# Patient Record
Sex: FEMALE | Race: WHITE | NOT HISPANIC OR LATINO | Employment: OTHER | URBAN - METROPOLITAN AREA
[De-identification: names, ages, dates, MRNs, and addresses within clinical notes are randomized per-mention and may not be internally consistent; named-entity substitution may affect disease eponyms.]

---

## 2017-05-02 ENCOUNTER — TRANSCRIBE ORDERS (OUTPATIENT)
Dept: ADMINISTRATIVE | Facility: HOSPITAL | Age: 67
End: 2017-05-02

## 2017-05-02 DIAGNOSIS — Z12.31 VISIT FOR SCREENING MAMMOGRAM: Primary | ICD-10-CM

## 2017-05-30 ENCOUNTER — APPOINTMENT (OUTPATIENT)
Dept: MAMMOGRAPHY | Facility: HOSPITAL | Age: 67
End: 2017-05-30

## 2017-06-01 ENCOUNTER — HOSPITAL ENCOUNTER (OUTPATIENT)
Dept: MAMMOGRAPHY | Facility: HOSPITAL | Age: 67
Discharge: HOME | End: 2017-06-01
Admitting: FAMILY MEDICINE

## 2017-06-01 DIAGNOSIS — Z12.31 VISIT FOR SCREENING MAMMOGRAM: ICD-10-CM

## 2017-06-01 PROCEDURE — 77063 BREAST TOMOSYNTHESIS BI: CPT

## 2017-06-01 PROCEDURE — G0202 SCR MAMMO BI INCL CAD: HCPCS

## 2017-06-01 PROCEDURE — G0202 SCR MAMMO BI INCL CAD: HCPCS | Performed by: RADIOLOGY

## 2017-06-01 PROCEDURE — 77063 BREAST TOMOSYNTHESIS BI: CPT | Performed by: RADIOLOGY

## 2018-05-17 ENCOUNTER — TRANSCRIBE ORDERS (OUTPATIENT)
Dept: ADMINISTRATIVE | Facility: HOSPITAL | Age: 68
End: 2018-05-17

## 2018-05-17 DIAGNOSIS — Z12.31 VISIT FOR SCREENING MAMMOGRAM: Primary | ICD-10-CM

## 2018-06-12 ENCOUNTER — HOSPITAL ENCOUNTER (OUTPATIENT)
Dept: MAMMOGRAPHY | Facility: HOSPITAL | Age: 68
Discharge: HOME | End: 2018-06-12
Admitting: FAMILY MEDICINE

## 2018-06-12 DIAGNOSIS — Z12.31 VISIT FOR SCREENING MAMMOGRAM: ICD-10-CM

## 2018-06-12 PROCEDURE — 77063 BREAST TOMOSYNTHESIS BI: CPT

## 2018-06-12 PROCEDURE — 77067 SCR MAMMO BI INCL CAD: CPT | Performed by: RADIOLOGY

## 2018-06-12 PROCEDURE — 77067 SCR MAMMO BI INCL CAD: CPT

## 2018-06-12 PROCEDURE — 77063 BREAST TOMOSYNTHESIS BI: CPT | Performed by: RADIOLOGY

## 2018-12-30 ENCOUNTER — APPOINTMENT (OUTPATIENT)
Dept: GENERAL RADIOLOGY | Facility: HOSPITAL | Age: 68
End: 2018-12-30

## 2018-12-30 ENCOUNTER — HOSPITAL ENCOUNTER (EMERGENCY)
Facility: HOSPITAL | Age: 68
Discharge: HOME | End: 2018-12-30
Attending: EMERGENCY MEDICINE | Admitting: EMERGENCY MEDICINE

## 2018-12-30 VITALS
BODY MASS INDEX: 22.34 KG/M2 | HEART RATE: 59 BPM | TEMPERATURE: 97.9 F | RESPIRATION RATE: 18 BRPM | WEIGHT: 139 LBS | DIASTOLIC BLOOD PRESSURE: 72 MMHG | OXYGEN SATURATION: 96 % | SYSTOLIC BLOOD PRESSURE: 138 MMHG | HEIGHT: 66 IN

## 2018-12-30 DIAGNOSIS — S50.02XA CONTUSION OF LEFT ELBOW, INITIAL ENCOUNTER: ICD-10-CM
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
## 2018-12-30 PROCEDURE — 99283 EMERGENCY DEPT VISIT LOW MDM: CPT

## 2018-12-30 PROCEDURE — 73080 X-RAY EXAM OF ELBOW: CPT

## 2018-12-30 RX ORDER — ACETAMINOPHEN 325 MG/1
650 TABLET ORAL EVERY 6 HOURS PRN
Status: DISCONTINUED | OUTPATIENT
Start: 2018-12-30 | End: 2018-12-30 | Stop reason: HOSPADM

## 2018-12-30 RX ORDER — IRBESARTAN AND HYDROCHLOROTHIAZIDE 300; 12.5 MG/1; MG/1
1 TABLET, FILM COATED ORAL DAILY
COMMUNITY
End: 2022-07-08

## 2018-12-30 RX ORDER — MELATONIN
1000 2 TIMES DAILY
COMMUNITY

## 2018-12-30 RX ADMIN — ACETAMINOPHEN 650 MG: 325 TABLET, FILM COATED ORAL at 09:56

## 2019-05-28 ENCOUNTER — TRANSCRIBE ORDERS (OUTPATIENT)
Dept: ADMINISTRATIVE | Facility: HOSPITAL | Age: 69
End: 2019-05-28

## 2019-05-28 DIAGNOSIS — Z12.31 VISIT FOR SCREENING MAMMOGRAM: Primary | ICD-10-CM

## 2019-08-05 ENCOUNTER — HOSPITAL ENCOUNTER (OUTPATIENT)
Dept: MAMMOGRAPHY | Facility: HOSPITAL | Age: 69
Discharge: HOME | End: 2019-08-05
Admitting: FAMILY MEDICINE

## 2019-08-05 DIAGNOSIS — Z12.31 VISIT FOR SCREENING MAMMOGRAM: ICD-10-CM

## 2019-08-05 PROCEDURE — 77063 BREAST TOMOSYNTHESIS BI: CPT

## 2019-08-05 PROCEDURE — 77067 SCR MAMMO BI INCL CAD: CPT | Performed by: RADIOLOGY

## 2019-08-05 PROCEDURE — 77067 SCR MAMMO BI INCL CAD: CPT

## 2019-08-05 PROCEDURE — 77063 BREAST TOMOSYNTHESIS BI: CPT | Performed by: RADIOLOGY

## 2020-06-30 ENCOUNTER — TRANSCRIBE ORDERS (OUTPATIENT)
Dept: ADMINISTRATIVE | Facility: HOSPITAL | Age: 70
End: 2020-06-30

## 2020-06-30 DIAGNOSIS — Z12.31 VISIT FOR SCREENING MAMMOGRAM: Primary | ICD-10-CM

## 2020-09-11 ENCOUNTER — APPOINTMENT (OUTPATIENT)
Dept: MAMMOGRAPHY | Facility: HOSPITAL | Age: 70
End: 2020-09-11

## 2020-11-20 ENCOUNTER — HOSPITAL ENCOUNTER (OUTPATIENT)
Dept: MAMMOGRAPHY | Facility: HOSPITAL | Age: 70
Discharge: HOME | End: 2020-11-20
Admitting: FAMILY MEDICINE

## 2020-11-20 DIAGNOSIS — Z12.31 VISIT FOR SCREENING MAMMOGRAM: ICD-10-CM

## 2020-11-20 PROCEDURE — 77063 BREAST TOMOSYNTHESIS BI: CPT | Performed by: RADIOLOGY

## 2020-11-20 PROCEDURE — 77067 SCR MAMMO BI INCL CAD: CPT | Performed by: RADIOLOGY

## 2020-11-20 PROCEDURE — 77067 SCR MAMMO BI INCL CAD: CPT

## 2020-11-20 PROCEDURE — 77063 BREAST TOMOSYNTHESIS BI: CPT

## 2021-01-18 ENCOUNTER — TELEPHONE (OUTPATIENT)
Dept: GASTROENTEROLOGY | Facility: CLINIC | Age: 71
End: 2021-01-18

## 2021-01-18 NOTE — TELEPHONE ENCOUNTER
CALLED PATIENT TO INFORM OF CLEARANCE, NO ANSWER, LVM FOR PATIENT TO CALL BACK. CLEARANCE SCANNED TO CHART.

## 2021-01-19 NOTE — TELEPHONE ENCOUNTER
SPOKE WITH PATIENT GAVE HER RECOMMENDATION, SHE GAVE VERBAL UNDERSTANDING.    CLEARANCE IS SCANNED TO CHART.

## 2021-01-27 DIAGNOSIS — Z12.11 SCREENING FOR COLON CANCER: Primary | ICD-10-CM

## 2021-01-31 ENCOUNTER — APPOINTMENT (OUTPATIENT)
Dept: PREADMISSION TESTING | Facility: HOSPITAL | Age: 71
End: 2021-01-31

## 2021-01-31 LAB — SARS-COV-2 RNA RESP QL NAA+PROBE: NOT DETECTED

## 2021-01-31 PROCEDURE — C9803 HOPD COVID-19 SPEC COLLECT: HCPCS

## 2021-01-31 PROCEDURE — U0004 COV-19 TEST NON-CDC HGH THRU: HCPCS

## 2021-02-02 ENCOUNTER — OUTSIDE FACILITY SERVICE (OUTPATIENT)
Dept: GASTROENTEROLOGY | Facility: CLINIC | Age: 71
End: 2021-02-02

## 2021-02-02 PROCEDURE — 45385 COLONOSCOPY W/LESION REMOVAL: CPT | Performed by: INTERNAL MEDICINE

## 2021-02-02 PROCEDURE — 88305 TISSUE EXAM BY PATHOLOGIST: CPT | Performed by: INTERNAL MEDICINE

## 2021-02-03 ENCOUNTER — LAB REQUISITION (OUTPATIENT)
Dept: LAB | Facility: HOSPITAL | Age: 71
End: 2021-02-03

## 2021-02-03 DIAGNOSIS — Z12.11 ENCOUNTER FOR SCREENING FOR MALIGNANT NEOPLASM OF COLON: ICD-10-CM

## 2021-02-04 LAB
CYTO UR: NORMAL
LAB AP CASE REPORT: NORMAL
LAB AP CLINICAL INFORMATION: NORMAL
PATH REPORT.FINAL DX SPEC: NORMAL
PATH REPORT.GROSS SPEC: NORMAL

## 2021-10-13 ENCOUNTER — TRANSCRIBE ORDERS (OUTPATIENT)
Dept: ADMINISTRATIVE | Facility: HOSPITAL | Age: 71
End: 2021-10-13

## 2021-10-13 DIAGNOSIS — Z12.31 VISIT FOR SCREENING MAMMOGRAM: Primary | ICD-10-CM

## 2021-11-16 ENCOUNTER — OFFICE VISIT (OUTPATIENT)
Dept: INTERNAL MEDICINE | Facility: CLINIC | Age: 71
End: 2021-11-16

## 2021-11-16 VITALS
OXYGEN SATURATION: 96 % | DIASTOLIC BLOOD PRESSURE: 90 MMHG | HEART RATE: 61 BPM | TEMPERATURE: 97.7 F | HEIGHT: 66 IN | SYSTOLIC BLOOD PRESSURE: 150 MMHG | BODY MASS INDEX: 22.36 KG/M2 | WEIGHT: 139.12 LBS

## 2021-11-16 DIAGNOSIS — F41.9 ANXIETY: ICD-10-CM

## 2021-11-16 DIAGNOSIS — I10 BENIGN HYPERTENSION: ICD-10-CM

## 2021-11-16 DIAGNOSIS — Z76.89 ENCOUNTER TO ESTABLISH CARE: Primary | ICD-10-CM

## 2021-11-16 DIAGNOSIS — I63.30 CEREBROVASCULAR ACCIDENT (CVA) DUE TO THROMBOSIS OF CEREBRAL ARTERY (HCC): ICD-10-CM

## 2021-11-16 DIAGNOSIS — E78.5 HYPERLIPIDEMIA, UNSPECIFIED HYPERLIPIDEMIA TYPE: ICD-10-CM

## 2021-11-16 DIAGNOSIS — G47.00 INSOMNIA, UNSPECIFIED TYPE: ICD-10-CM

## 2021-11-16 PROCEDURE — 99203 OFFICE O/P NEW LOW 30 MIN: CPT | Performed by: NURSE PRACTITIONER

## 2021-11-16 RX ORDER — MELATONIN 3 MG
LOZENGE ORAL
COMMUNITY

## 2021-11-16 RX ORDER — FEXOFENADINE HYDROCHLORIDE 60 MG/1
60 TABLET, FILM COATED ORAL DAILY
COMMUNITY

## 2021-11-16 RX ORDER — CLOPIDOGREL BISULFATE 75 MG/1
75 TABLET ORAL DAILY
Qty: 90 TABLET | Refills: 3 | Status: SHIPPED | OUTPATIENT
Start: 2021-11-16 | End: 2022-12-13

## 2021-11-16 RX ORDER — ACETAMINOPHEN 500 MG
500 TABLET ORAL EVERY 6 HOURS PRN
COMMUNITY
End: 2023-03-13

## 2021-11-16 RX ORDER — PRAVASTATIN SODIUM 40 MG
40 TABLET ORAL DAILY
COMMUNITY
End: 2022-01-03 | Stop reason: SDUPTHER

## 2021-11-16 RX ORDER — HYDROCHLOROTHIAZIDE 12.5 MG/1
12.5 TABLET ORAL DAILY
Qty: 60 TABLET | Refills: 0 | Status: SHIPPED | OUTPATIENT
Start: 2021-11-16 | End: 2022-01-12

## 2021-11-16 NOTE — PROGRESS NOTES
Date: 2021    Name: Lisa Cruz  : 1950    Chief Complaint:   Chief Complaint   Patient presents with   • [facility]     med refill       HPI:  Lisa Cruz is a 71 y.o. female presents to [facility]. Her PCP is no longer practicing in [place].      HTN: monitors at home.  Takes carvedilol 12.5 mg daily, Avalide 300-12.5 mg daily.  Feels losartan control blood pressure better than irbesartan, was switched when losartan was off the market.  Tries to eat a heart healthy diet.  Is physically active daily, no formal exercise program. Denies chest pain, dyspnea, orthopnea, palpitations, lower extremity edema, confusion, headaches, weakness, visual disturbances.    CVA, hyperlipidemia: No residual.  Takes clopidogrel 75 mg, pravastatin 40 mg as prescribed.  Requesting refill of Plavix. States labs were drawn, including lipid panel, within the past 6 months per previous provider.    Has some anxiety, not currently taking medication for it. Denies depressed mood, anhedonia, insomnia, hypersomnia, fatigue, feelings of worthlessness, difficulty concentrating, impaired memory, SI, HI, panic attacks, weight change.    Insomnia: Takes melatonin liquid, works well.       History:  LMP: No LMP recorded. Patient has had a hysterectomy.  Menopause at 41 years  Sexual activity: monogamous, heterosexual relationship  : 3  Para: 2    Do you take any herbs or supplements that were not prescribed by a doctor? yes, biactive, benefiber, melatonin  Are you taking calcium supplements? yes  Are you taking aspirin daily? no      Health Habits:  Dental Exam. up to date  Eye Exam. up to date, wears glasses  Exercise: gardening, yardwork daily during warm weather.  Housekeeping during cold weather  Current diet: well balanced    History:    Past Medical History:   Diagnosis Date   • Colon polyp    • GERD (gastroesophageal reflux disease)    • History of blood transfusion    • Hyperlipidemia    •  Hypertension    • Osteoporosis    • Ovarian cyst    • Stroke (HCC)        Past Surgical History:   Procedure Laterality Date   • BIOPSY OF LEG     • BREAST EXCISIONAL BIOPSY Bilateral     5 biopsies 1970; 1975; 1983; 1986; 1989   • HYSTERECTOMY  1991    Total w/BSO   • OOPHORECTOMY Bilateral 1991       Family History   Problem Relation Age of Onset   • Ovarian cancer Maternal Aunt 88   • Diabetes Mother    • Hypertension Mother    • Hyperlipidemia Mother    • Osteoporosis Mother    • Stroke Mother    • Cancer Father         lymphoma   • Diabetes Father    • Hypertension Father    • Hyperlipidemia Father        Social History     Socioeconomic History   • Marital status:    Tobacco Use   • Smoking status: Never Smoker   • Smokeless tobacco: Never Used   Substance and Sexual Activity   • Alcohol use: No     Comment: occ   • Drug use: No   • Sexual activity: Defer       No Known Allergies      Current Outpatient Medications:   •  acetaminophen (TYLENOL) 500 MG tablet, Take 500 mg by mouth Every 6 (Six) Hours As Needed for Mild Pain ., Disp: , Rfl:   •  carvedilol (COREG) 12.5 MG tablet, 6.25 mg., Disp: , Rfl: 0  •  cholecalciferol (VITAMIN D3) 1000 units tablet, Take 1,000 Units by mouth 2 (Two) Times a Day., Disp: , Rfl:   •  clopidogrel (PLAVIX) 75 MG tablet, Take 1 tablet by mouth Daily., Disp: 90 tablet, Rfl: 3  •  fexofenadine (ALLEGRA) 60 MG tablet, Take 60 mg by mouth Daily., Disp: , Rfl:   •  irbesartan-hydrochlorothiazide (AVALIDE) 300-12.5 MG tablet, Take 1 tablet by mouth Daily., Disp: , Rfl:   •  LUMIGAN 0.01 % ophthalmic drops, , Disp: , Rfl: 0  •  Melatonin 1 MG/4ML liquid, melatonin  1 po QHS, Disp: , Rfl:   •  pravastatin (PRAVACHOL) 40 MG tablet, Take 40 mg by mouth Daily. 1/2 tablet daily, Disp: , Rfl:   •  hydroCHLOROthiazide (HYDRODIURIL) 12.5 MG tablet, Take 1 tablet by mouth Daily., Disp: 60 tablet, Rfl: 0    VS:  Vitals:    11/16/21 0842   BP: 150/90   Pulse: 61   Temp: 97.7 °F (36.5 °C)  "  TempSrc: Infrared   SpO2: 96%   Weight: 63.1 kg (139 lb 1.9 oz)   Height: 167.6 cm (66\")     Body mass index is 22.45 kg/m².    PE:  Physical Exam  Constitutional:       Appearance: She is well-developed. She is not ill-appearing.   HENT:      Head: Normocephalic.      Right Ear: External ear normal.      Left Ear: External ear normal.   Eyes:      Extraocular Movements: Extraocular movements intact.      Conjunctiva/sclera: Conjunctivae normal.      Pupils: Pupils are equal, round, and reactive to light.   Neck:      Thyroid: No thyromegaly.   Cardiovascular:      Rate and Rhythm: Normal rate and regular rhythm.      Pulses:           Dorsalis pedis pulses are 2+ on the right side and 2+ on the left side.      Heart sounds: Normal heart sounds.   Pulmonary:      Effort: Pulmonary effort is normal.      Breath sounds: Normal breath sounds.   Musculoskeletal:      Cervical back: Full passive range of motion without pain, normal range of motion and neck supple.      Right lower le+ Edema present.      Left lower le+ Edema present.   Skin:     General: Skin is warm.      Capillary Refill: Capillary refill takes less than 2 seconds.   Neurological:      Mental Status: She is alert and oriented to person, place, and time.      Sensory: No sensory deficit.      Coordination: Coordination normal.      Gait: Gait normal.      Comments: CN II-XII normal   Psychiatric:         Attention and Perception: Attention normal.         Mood and Affect: Mood and affect normal.         Speech: Speech normal.         Behavior: Behavior normal.         Thought Content: Thought content normal.         Assessment/Plan:     Diagnoses and all orders for this visit:    1. Encounter to establish care (Primary)    2. Benign hypertension  -     hydroCHLOROthiazide (HYDRODIURIL) 12.5 MG tablet; Take 1 tablet by mouth Daily.  Dispense: 60 tablet; Refill: 0.  This is to be taken in addition to carvedilol, Avalide.        - Follow heart " healthy diet.  Advised to reduce daily sodium intake to < 1500 mg per day.  Avoid processed & fast foods.        - Monitor blood pressure as discussed, keep log and bring to next appointment.          - Exercise as tolerated, with a goal of 30 minutes of moderate exercise most days.         - Take medications as prescribed.    3. Hyperlipidemia, unspecified hyperlipidemia type        - Diet, daily physical activity.  Continue taking pravastatin 40 mg daily    4. Anxiety        - Discussed medication, referral to counseling.  Patient politely declines at this time        - Encouraged to take part in daily physical exercise.          - Eat healthy, well balanced diet; avoid sugary foods or beverages        - Limit alcohol intake        - Ensure good night's sleep by creating calm space in bedroom, avoiding screen time 1-2 hours before bed, no caffeine after 5 pm        - Talk to supportive family and friends, as needed        - Consider journaling, other creative way to express feelings, if needed    5. Insomnia, unspecified type        - Keep bedroom dark, cool, tidy.        - Continue melatonin per package directions    6. Cerebrovascular accident (CVA) due to thrombosis of cerebral artery (HCC)  -     clopidogrel (PLAVIX) 75 MG tablet; Take 1 tablet by mouth Daily.  Dispense: 90 tablet; Refill: 3        - Monitor for s/s of stroke, call 911 should these develop        Return in about 4 weeks (around 12/14/2021) for Next scheduled follow up.

## 2021-12-14 ENCOUNTER — OFFICE VISIT (OUTPATIENT)
Dept: INTERNAL MEDICINE | Facility: CLINIC | Age: 71
End: 2021-12-14

## 2021-12-14 ENCOUNTER — HOSPITAL ENCOUNTER (OUTPATIENT)
Dept: MAMMOGRAPHY | Facility: HOSPITAL | Age: 71
Discharge: HOME | End: 2021-12-14
Admitting: INTERNAL MEDICINE

## 2021-12-14 VITALS
BODY MASS INDEX: 22.18 KG/M2 | TEMPERATURE: 97.1 F | HEIGHT: 66 IN | HEART RATE: 54 BPM | SYSTOLIC BLOOD PRESSURE: 130 MMHG | OXYGEN SATURATION: 99 % | WEIGHT: 138 LBS | DIASTOLIC BLOOD PRESSURE: 78 MMHG

## 2021-12-14 DIAGNOSIS — Z12.31 VISIT FOR SCREENING MAMMOGRAM: ICD-10-CM

## 2021-12-14 DIAGNOSIS — E78.5 HYPERLIPIDEMIA, UNSPECIFIED HYPERLIPIDEMIA TYPE: ICD-10-CM

## 2021-12-14 DIAGNOSIS — Z78.0 POSTMENOPAUSAL: ICD-10-CM

## 2021-12-14 DIAGNOSIS — Z13.820 SCREENING FOR OSTEOPOROSIS: ICD-10-CM

## 2021-12-14 DIAGNOSIS — I10 BENIGN HYPERTENSION: Primary | ICD-10-CM

## 2021-12-14 DIAGNOSIS — E55.9 VITAMIN D DEFICIENCY: ICD-10-CM

## 2021-12-14 DIAGNOSIS — Z13.0 SCREENING FOR DISORDER OF BLOOD AND BLOOD-FORMING ORGANS: ICD-10-CM

## 2021-12-14 PROCEDURE — 77063 BREAST TOMOSYNTHESIS BI: CPT

## 2021-12-14 PROCEDURE — 77067 SCR MAMMO BI INCL CAD: CPT

## 2021-12-14 PROCEDURE — 77067 SCR MAMMO BI INCL CAD: CPT | Performed by: RADIOLOGY

## 2021-12-14 PROCEDURE — 99214 OFFICE O/P EST MOD 30 MIN: CPT | Performed by: NURSE PRACTITIONER

## 2021-12-14 PROCEDURE — 77063 BREAST TOMOSYNTHESIS BI: CPT | Performed by: RADIOLOGY

## 2021-12-14 RX ORDER — CARVEDILOL 25 MG/1
25 TABLET ORAL 2 TIMES DAILY WITH MEALS
Qty: 60 TABLET | Refills: 1 | Status: SHIPPED | OUTPATIENT
Start: 2021-12-14 | End: 2022-02-09

## 2021-12-14 NOTE — PROGRESS NOTES
Office Visit      Patient Name: Lisa Cruz  : 1950   MRN: [id number]     Chief Complaint:    Chief Complaint   Patient presents with   • Hypertension     4 wk f/u       History of Present Illness: Lisa Cruz is a 71 y.o. female who is here today for follow up of HTN.  Prescribed hctz 12.5 mg on 21, when she established care on 21; to be taken in addition to carvedilol 12.5 mg BID, avallide 300-12.5 mg.  Monitors BP at home, readings have been 125-146/72-80.  Heart rate, 61-81.  Eats a heart healthy diet. Denies chest pain, dyspnea, orthopnea, palpitations, lower extremity edema, confusion, headaches, weakness, visual disturbances.  Has vit d deficiency, hyperlipids.  Not currently taking vit d supplement.  Is taking pravastatin 40 mg daily.      Subjective      I have reviewed and the following portions of the patient's history were updated as appropriate: past family history, past medical history, past social history, past surgical history and problem list.      Current Outpatient Medications:   •  acetaminophen (TYLENOL) 500 MG tablet, Take 500 mg by mouth Every 6 (Six) Hours As Needed for Mild Pain ., Disp: , Rfl:   •  cholecalciferol (VITAMIN D3) 1000 units tablet, Take 1,000 Units by mouth 2 (Two) Times a Day., Disp: , Rfl:   •  clopidogrel (PLAVIX) 75 MG tablet, Take 1 tablet by mouth Daily., Disp: 90 tablet, Rfl: 3  •  fexofenadine (ALLEGRA) 60 MG tablet, Take 60 mg by mouth Daily., Disp: , Rfl:   •  hydroCHLOROthiazide (HYDRODIURIL) 12.5 MG tablet, Take 1 tablet by mouth Daily., Disp: 60 tablet, Rfl: 0  •  irbesartan-hydrochlorothiazide (AVALIDE) 300-12.5 MG tablet, Take 1 tablet by mouth Daily., Disp: , Rfl:   •  LUMIGAN 0.01 % ophthalmic drops, , Disp: , Rfl: 0  •  Melatonin 1 MG/4ML liquid, melatonin  1 po QHS, Disp: , Rfl:   •  pravastatin (PRAVACHOL) 40 MG tablet, Take 40 mg by mouth Daily. 1/2 tablet daily, Disp: , Rfl:   •  carvedilol (Coreg) 25 MG tablet, Take 1  "tablet by mouth 2 (Two) Times a Day With Meals., Disp: 60 tablet, Rfl: 1    No Known Allergies    Objective     Physical Exam:  Vital Signs:   Vitals:    12/14/21 1020   BP: 130/78   Pulse: 54   Temp: 97.1 °F (36.2 °C)   TempSrc: Infrared   SpO2: 99%   Weight: 62.6 kg (138 lb)   Height: 167.6 cm (66\")     Body mass index is 22.27 kg/m².    Physical Exam  Constitutional:       Appearance: She is not ill-appearing.   HENT:      Head: Normocephalic.      Right Ear: External ear normal.      Left Ear: External ear normal.   Eyes:      Conjunctiva/sclera: Conjunctivae normal.      Pupils: Pupils are equal, round, and reactive to light.   Cardiovascular:      Rate and Rhythm: Normal rate and regular rhythm.      Pulses:           Radial pulses are 2+ on the right side and 2+ on the left side.        Dorsalis pedis pulses are 2+ on the right side and 2+ on the left side.      Heart sounds: Normal heart sounds.   Pulmonary:      Effort: Pulmonary effort is normal.      Breath sounds: Normal breath sounds.   Musculoskeletal:      Cervical back: Normal range of motion and neck supple.      Right lower leg: No edema.      Left lower leg: No edema.   Skin:     General: Skin is warm.      Capillary Refill: Capillary refill takes less than 2 seconds.   Neurological:      Mental Status: She is alert and oriented to person, place, and time.      Coordination: Coordination normal.      Gait: Gait normal.   Psychiatric:         Mood and Affect: Mood normal.         Behavior: Behavior normal.         Thought Content: Thought content normal.       Assessment / Plan      Assessment/Plan:   Diagnoses and all orders for this visit:    1. Benign hypertension (Primary)  -     carvedilol (Coreg) 25 MG tablet; Take 1 tablet by mouth 2 (Two) Times a Day With Meals.  Dispense: 60 tablet; Refill: 1.  Increasing dose  - Continue hctz, avalide (total of 25 mg hctz per day)  -     Comprehensive Metabolic Panel        - Follow heart healthy diet.  " Advised to reduce daily sodium intake to < 1500 mg per day.  Avoid processed & fast foods.        - Monitor blood pressure as discussed, keep log and bring to next appointment.  If BP continues to be over normal limits, will return to clinic.  Patient verbalizes understanding of normal limits.          - Exercise as tolerated, with a goal of 30 minutes of moderate exercise most days.         - Take medications as prescribed.    2. Vitamin D deficiency  -     Vitamin D 25 Hydroxy    3. Hyperlipidemia, unspecified hyperlipidemia type  -     Lipid Panel  - Heart healthy diet  - Continue pravastatin 40 mg daily  - Daily physical activity    4. Screening for disorder of blood and blood-forming organs  -     CBC & Differential  -     Vitamin B12    5. Postmenopausal  -     dexa bone density axial; Future    6. Screening for osteoporosis  -     dexa bone density axial; Future      Follow Up:   Return in about 3 months (around 3/14/2022) for Next scheduled follow up.    Patient was given instructions and counseling regarding her condition or for health maintenance advice. Please see specific information pulled into the AVS if appropriate.       Primary Care [address] [place]     Please note that portions of this note may have been completed with a voice recognition program. Efforts were made to edit dictation, but occasionally words are mistranscribed.

## 2021-12-21 ENCOUNTER — APPOINTMENT (OUTPATIENT)
Dept: BONE DENSITY | Facility: HOSPITAL | Age: 71
End: 2021-12-21

## 2021-12-21 DIAGNOSIS — Z13.820 SCREENING FOR OSTEOPOROSIS: ICD-10-CM

## 2021-12-21 DIAGNOSIS — Z78.0 POSTMENOPAUSAL: ICD-10-CM

## 2021-12-21 LAB
25(OH)D3+25(OH)D2 SERPL-MCNC: 91.2 NG/ML (ref 30–100)
ALBUMIN SERPL-MCNC: 4.4 G/DL (ref 3.5–5.2)
ALBUMIN/GLOB SERPL: 1.9 G/DL
ALP SERPL-CCNC: 107 U/L (ref 39–117)
ALT SERPL-CCNC: 14 U/L (ref 1–33)
AST SERPL-CCNC: 13 U/L (ref 1–32)
BASOPHILS # BLD AUTO: 0.05 10*3/MM3 (ref 0–0.2)
BASOPHILS NFR BLD AUTO: 0.9 % (ref 0–1.5)
BILIRUB SERPL-MCNC: 0.4 MG/DL (ref 0–1.2)
BUN SERPL-MCNC: 16 MG/DL (ref 8–23)
BUN/CREAT SERPL: 20 (ref 7–25)
CALCIUM SERPL-MCNC: 9.6 MG/DL (ref 8.6–10.5)
CHLORIDE SERPL-SCNC: 104 MMOL/L (ref 98–107)
CHOLEST SERPL-MCNC: 138 MG/DL (ref 0–200)
CO2 SERPL-SCNC: 30.5 MMOL/L (ref 22–29)
CREAT SERPL-MCNC: 0.8 MG/DL (ref 0.57–1)
EOSINOPHIL # BLD AUTO: 0.17 10*3/MM3 (ref 0–0.4)
EOSINOPHIL NFR BLD AUTO: 3.2 % (ref 0.3–6.2)
ERYTHROCYTE [DISTWIDTH] IN BLOOD BY AUTOMATED COUNT: 12.2 % (ref 12.3–15.4)
GLOBULIN SER CALC-MCNC: 2.3 GM/DL
GLUCOSE SERPL-MCNC: 101 MG/DL (ref 65–99)
HCT VFR BLD AUTO: 40.2 % (ref 34–46.6)
HDLC SERPL-MCNC: 59 MG/DL (ref 40–60)
HGB BLD-MCNC: 13.4 G/DL (ref 12–15.9)
IMM GRANULOCYTES # BLD AUTO: 0.01 10*3/MM3 (ref 0–0.05)
IMM GRANULOCYTES NFR BLD AUTO: 0.2 % (ref 0–0.5)
LDLC SERPL CALC-MCNC: 66 MG/DL (ref 0–100)
LYMPHOCYTES # BLD AUTO: 1.44 10*3/MM3 (ref 0.7–3.1)
LYMPHOCYTES NFR BLD AUTO: 26.9 % (ref 19.6–45.3)
MCH RBC QN AUTO: 30.5 PG (ref 26.6–33)
MCHC RBC AUTO-ENTMCNC: 33.3 G/DL (ref 31.5–35.7)
MCV RBC AUTO: 91.6 FL (ref 79–97)
MONOCYTES # BLD AUTO: 0.34 10*3/MM3 (ref 0.1–0.9)
MONOCYTES NFR BLD AUTO: 6.3 % (ref 5–12)
NEUTROPHILS # BLD AUTO: 3.35 10*3/MM3 (ref 1.7–7)
NEUTROPHILS NFR BLD AUTO: 62.5 % (ref 42.7–76)
NRBC BLD AUTO-RTO: 0 /100 WBC (ref 0–0.2)
PLATELET # BLD AUTO: 228 10*3/MM3 (ref 140–450)
POTASSIUM SERPL-SCNC: 4 MMOL/L (ref 3.5–5.2)
PROT SERPL-MCNC: 6.7 G/DL (ref 6–8.5)
RBC # BLD AUTO: 4.39 10*6/MM3 (ref 3.77–5.28)
SODIUM SERPL-SCNC: 144 MMOL/L (ref 136–145)
TRIGL SERPL-MCNC: 65 MG/DL (ref 0–150)
VIT B12 SERPL-MCNC: 520 PG/ML (ref 211–946)
VLDLC SERPL CALC-MCNC: 13 MG/DL (ref 5–40)
WBC # BLD AUTO: 5.36 10*3/MM3 (ref 3.4–10.8)

## 2021-12-21 PROCEDURE — 77080 DXA BONE DENSITY AXIAL: CPT

## 2022-01-03 RX ORDER — PRAVASTATIN SODIUM 40 MG
40 TABLET ORAL DAILY
Qty: 30 TABLET | Refills: 3 | Status: SHIPPED | OUTPATIENT
Start: 2022-01-03 | End: 2022-06-27

## 2022-01-03 NOTE — TELEPHONE ENCOUNTER
Caller: CruzLisa    Relationship: Self    Best call back number: [phone]     Requested Prescriptions:   Requested Prescriptions     Pending Prescriptions Disp Refills   • pravastatin (PRAVACHOL) 40 MG tablet       Sig: Take 1 tablet by mouth Daily. 1/2 tablet daily        Pharmacy where request should be sent: [id number] [place] [address] AT [facility] - [phone]  - [phone] FX     Additional details provided by patient: PATIENT HAS 3 DAYS LEFT    Does the patient have less than a 3 day supply:  [x] Yes  [] No    Ann Marie Lovell Rep   01/03/22 15:21 EST

## 2022-01-12 DIAGNOSIS — I10 BENIGN HYPERTENSION: ICD-10-CM

## 2022-01-12 RX ORDER — HYDROCHLOROTHIAZIDE 12.5 MG/1
12.5 TABLET ORAL DAILY
Qty: 60 TABLET | Refills: 0 | Status: SHIPPED | OUTPATIENT
Start: 2022-01-12 | End: 2022-01-13

## 2022-01-13 DIAGNOSIS — I10 BENIGN HYPERTENSION: ICD-10-CM

## 2022-01-13 RX ORDER — HYDROCHLOROTHIAZIDE 12.5 MG/1
12.5 TABLET ORAL DAILY
Qty: 90 TABLET | Refills: 0 | Status: SHIPPED | OUTPATIENT
Start: 2022-01-13 | End: 2022-04-11

## 2022-02-09 DIAGNOSIS — I10 BENIGN HYPERTENSION: ICD-10-CM

## 2022-02-09 RX ORDER — CARVEDILOL 25 MG/1
TABLET ORAL
Qty: 60 TABLET | Refills: 1 | Status: SHIPPED | OUTPATIENT
Start: 2022-02-09 | End: 2022-02-09

## 2022-02-09 RX ORDER — CARVEDILOL 25 MG/1
TABLET ORAL
Qty: 180 TABLET | Refills: 1 | Status: SHIPPED | OUTPATIENT
Start: 2022-02-09 | End: 2022-06-27

## 2022-03-16 ENCOUNTER — OFFICE VISIT (OUTPATIENT)
Dept: INTERNAL MEDICINE | Facility: CLINIC | Age: 72
End: 2022-03-16

## 2022-03-16 VITALS
HEIGHT: 66 IN | TEMPERATURE: 97.3 F | WEIGHT: 139 LBS | BODY MASS INDEX: 22.34 KG/M2 | DIASTOLIC BLOOD PRESSURE: 62 MMHG | HEART RATE: 66 BPM | SYSTOLIC BLOOD PRESSURE: 100 MMHG | OXYGEN SATURATION: 97 %

## 2022-03-16 DIAGNOSIS — I63.30 CEREBROVASCULAR ACCIDENT (CVA) DUE TO THROMBOSIS OF CEREBRAL ARTERY: ICD-10-CM

## 2022-03-16 DIAGNOSIS — I10 BENIGN HYPERTENSION: Primary | ICD-10-CM

## 2022-03-16 PROCEDURE — 99214 OFFICE O/P EST MOD 30 MIN: CPT | Performed by: NURSE PRACTITIONER

## 2022-03-16 NOTE — PROGRESS NOTES
Office Visit      Patient Name: Lisa Cruz  : 1950   MRN: [id number]     Chief Complaint:    Chief Complaint   Patient presents with   • Follow-up     hypertension       History of Present Illness: Lisa Cruz is a 71 y.o. female who is here today for follow up of HTN.  She monitors her blood pressure at home.  Typically higher later in the day, typically low in the morning.  Feels dizzy when low in the mornings.  Higher later in the day.  Does not take it at the same time every day, sometimes forgets about it.  She is taking carvedilol 25 mg twice daily, irbesartan-HCTZ 300-12.5 mg HCTZ 12.5 mg daily.  History of CVA, takes Plavix and pravastatin daily.  Tries to eat a heart healthy diet. Denies chest pain, dyspnea, orthopnea, palpitations, lower extremity edema, confusion, headaches, weakness, visual disturbances.      Subjective      I have reviewed and the following portions of the patient's history were updated as appropriate: past family history, past medical history, past social history, past surgical history and problem list.      Current Outpatient Medications:   •  acetaminophen (TYLENOL) 500 MG tablet, Take 500 mg by mouth Every 6 (Six) Hours As Needed for Mild Pain ., Disp: , Rfl:   •  carvedilol (COREG) 25 MG tablet, TAKE 1 TABLET BY MOUTH TWICE DAILY WITH MEALS, Disp: 180 tablet, Rfl: 1  •  cholecalciferol (VITAMIN D3) 1000 units tablet, Take 1,000 Units by mouth 2 (Two) Times a Day., Disp: , Rfl:   •  clopidogrel (PLAVIX) 75 MG tablet, Take 1 tablet by mouth Daily., Disp: 90 tablet, Rfl: 3  •  fexofenadine (ALLEGRA) 60 MG tablet, Take 60 mg by mouth Daily., Disp: , Rfl:   •  hydroCHLOROthiazide (HYDRODIURIL) 12.5 MG tablet, TAKE 1 TABLET BY MOUTH DAILY, Disp: 90 tablet, Rfl: 0  •  irbesartan-hydrochlorothiazide (AVALIDE) 300-12.5 MG tablet, Take 1 tablet by mouth Daily., Disp: , Rfl:   •  LUMIGAN 0.01 % ophthalmic drops, , Disp: , Rfl: 0  •  Melatonin 1 MG/4ML liquid, melatonin  " 1 po QHS, Disp: , Rfl:   •  pravastatin (PRAVACHOL) 40 MG tablet, Take 1 tablet by mouth Daily. 1/2 tablet daily, Disp: 30 tablet, Rfl: 3    No Known Allergies    Objective     Physical Exam:  Vital Signs:   Vitals:    03/16/22 1008 03/16/22 1009   BP:  100/62   Pulse:  66   Temp:  97.3 °F (36.3 °C)   SpO2:  97%   Weight:  63 kg (139 lb)   Height: 167.6 cm (65.98\") 167.6 cm (65.98\")     Body mass index is 22.45 kg/m².    Physical Exam  Constitutional:       Appearance: She is not ill-appearing.   HENT:      Head: Normocephalic.      Right Ear: External ear normal.      Left Ear: External ear normal.   Eyes:      Conjunctiva/sclera: Conjunctivae normal.      Pupils: Pupils are equal, round, and reactive to light.   Cardiovascular:      Rate and Rhythm: Normal rate and regular rhythm.      Pulses:           Radial pulses are 2+ on the right side and 2+ on the left side.        Dorsalis pedis pulses are 2+ on the right side and 2+ on the left side.      Heart sounds: Normal heart sounds.   Pulmonary:      Effort: Pulmonary effort is normal.      Breath sounds: Normal breath sounds.   Musculoskeletal:      Cervical back: Normal range of motion and neck supple.      Right lower leg: No edema.      Left lower leg: No edema.   Skin:     General: Skin is warm.      Capillary Refill: Capillary refill takes less than 2 seconds.   Neurological:      Mental Status: She is alert and oriented to person, place, and time.      Coordination: Coordination normal.      Gait: Gait normal.   Psychiatric:         Mood and Affect: Mood normal.         Behavior: Behavior normal.         Thought Content: Thought content normal.       Assessment / Plan      Assessment/Plan:   Diagnoses and all orders for this visit:    1. Benign hypertension (Primary)         - To take carvedilol 12.5 mg HS, keep other meds the same.          - Follow heart healthy diet.  Keep sodium intake < 1500 mg per day.  Avoid processed & fast foods.          " - Exercise as tolerated, with a goal of 30 minutes of moderate exercise most days.         - Take medications as prescribed.    2. Cerebrovascular accident (CVA) due to thrombosis of cerebral artery (HCC)         - Heart healthy diet, daily physical activity.  Continue Plavix and pravastatin daily.    Follow Up:   Return in about 3 months (around 6/16/2022) for Next scheduled follow up.    Patient was given instructions and counseling regarding her condition or for health maintenance advice. Please see specific information pulled into the AVS if appropriate.       Primary Care [address] [place]     Please note that portions of this note may have been completed with a voice recognition program. Efforts were made to edit dictation, but occasionally words are mistranscribed.

## 2022-04-10 DIAGNOSIS — I10 BENIGN HYPERTENSION: ICD-10-CM

## 2022-04-11 RX ORDER — HYDROCHLOROTHIAZIDE 12.5 MG/1
12.5 TABLET ORAL DAILY
Qty: 90 TABLET | Refills: 0 | Status: SHIPPED | OUTPATIENT
Start: 2022-04-11 | End: 2022-06-27

## 2022-06-20 ENCOUNTER — OFFICE VISIT (OUTPATIENT)
Dept: INTERNAL MEDICINE | Facility: CLINIC | Age: 72
End: 2022-06-20

## 2022-06-20 VITALS
HEART RATE: 69 BPM | WEIGHT: 140 LBS | TEMPERATURE: 97.6 F | DIASTOLIC BLOOD PRESSURE: 84 MMHG | BODY MASS INDEX: 22.5 KG/M2 | OXYGEN SATURATION: 98 % | HEIGHT: 66 IN | SYSTOLIC BLOOD PRESSURE: 136 MMHG

## 2022-06-20 DIAGNOSIS — Z13.0 SCREENING FOR ENDOCRINE, METABOLIC AND IMMUNITY DISORDER: ICD-10-CM

## 2022-06-20 DIAGNOSIS — I63.30 CEREBROVASCULAR ACCIDENT (CVA) DUE TO THROMBOSIS OF CEREBRAL ARTERY: ICD-10-CM

## 2022-06-20 DIAGNOSIS — Z13.228 SCREENING FOR ENDOCRINE, METABOLIC AND IMMUNITY DISORDER: ICD-10-CM

## 2022-06-20 DIAGNOSIS — E78.5 HYPERLIPIDEMIA, UNSPECIFIED HYPERLIPIDEMIA TYPE: ICD-10-CM

## 2022-06-20 DIAGNOSIS — Z13.29 SCREENING FOR ENDOCRINE, METABOLIC AND IMMUNITY DISORDER: ICD-10-CM

## 2022-06-20 DIAGNOSIS — I10 BENIGN HYPERTENSION: Primary | ICD-10-CM

## 2022-06-20 DIAGNOSIS — Z78.0 POSTMENOPAUSAL: ICD-10-CM

## 2022-06-20 DIAGNOSIS — N39.3 STRESS INCONTINENCE OF URINE: ICD-10-CM

## 2022-06-20 DIAGNOSIS — Z13.0 SCREENING FOR DISORDER OF BLOOD AND BLOOD-FORMING ORGANS: ICD-10-CM

## 2022-06-20 DIAGNOSIS — E55.9 VITAMIN D DEFICIENCY: ICD-10-CM

## 2022-06-20 PROBLEM — K64.9 HEMORRHOIDS: Status: ACTIVE | Noted: 2022-06-20

## 2022-06-20 PROCEDURE — 99214 OFFICE O/P EST MOD 30 MIN: CPT | Performed by: NURSE PRACTITIONER

## 2022-06-20 RX ORDER — AMLODIPINE BESYLATE 5 MG/1
5 TABLET ORAL DAILY
Qty: 90 TABLET | Refills: 0 | Status: SHIPPED | OUTPATIENT
Start: 2022-06-20 | End: 2022-09-15

## 2022-06-20 NOTE — PROGRESS NOTES
Office Visit      Patient Name: Lisa Cruz  : 1950   MRN: [id number]     Chief Complaint:    Chief Complaint   Patient presents with   • Hypertension       History of Present Illness: Lisa Cruz is a 72 y.o. female who is here today for follow up of HTN.  Takes carvedilol 25 mg BID, hctz 12.5 mg and irbesartan-hctz 300-12.5 mg daily.  H/O hyperlipids, CVA.  Takes pravachol, clopidogrel as prescribed.   Tries to eat a heart healthy diet.  Physically active most days of the week.  Denies chest pain, dyspnea, orthopnea, palpitations, lower extremity edema, confusion, headaches, weakness, visual disturbances.   Would like to have labs drawn today.  History of vitamin D deficiency.  Has noted stress urinary incontinence, particularly when playing with/lifting grandchildren.  She is aware caffeine irritates the bladder, does not drink it.  No fever, chills, confusion, increased fatigue, pelvic pressure, dysuria.        Subjective      I have reviewed and the following portions of the patient's history were updated as appropriate: past family history, past medical history, past social history, past surgical history and problem list.      Current Outpatient Medications:   •  acetaminophen (TYLENOL) 500 MG tablet, Take 500 mg by mouth Every 6 (Six) Hours As Needed for Mild Pain ., Disp: , Rfl:   •  carvedilol (COREG) 25 MG tablet, TAKE 1 TABLET BY MOUTH TWICE DAILY WITH MEALS, Disp: 180 tablet, Rfl: 1  •  cholecalciferol (VITAMIN D3) 1000 units tablet, Take 1,000 Units by mouth 2 (Two) Times a Day., Disp: , Rfl:   •  clopidogrel (PLAVIX) 75 MG tablet, Take 1 tablet by mouth Daily., Disp: 90 tablet, Rfl: 3  •  fexofenadine (ALLEGRA) 60 MG tablet, Take 60 mg by mouth Daily., Disp: , Rfl:   •  hydroCHLOROthiazide (HYDRODIURIL) 12.5 MG tablet, TAKE 1 TABLET BY MOUTH DAILY, Disp: 90 tablet, Rfl: 0  •  irbesartan-hydrochlorothiazide (AVALIDE) 300-12.5 MG tablet, Take 1 tablet by mouth Daily., Disp: , Rfl:  "  •  LUMIGAN 0.01 % ophthalmic drops, , Disp: , Rfl: 0  •  Melatonin 1 MG/4ML liquid, melatonin  1 po QHS, Disp: , Rfl:   •  pravastatin (PRAVACHOL) 40 MG tablet, Take 1 tablet by mouth Daily. 1/2 tablet daily, Disp: 30 tablet, Rfl: 3  •  amLODIPine (NORVASC) 5 MG tablet, Take 1 tablet by mouth Daily., Disp: 90 tablet, Rfl: 0    No Known Allergies    Objective     Physical Exam:  Vital Signs:   Vitals:    06/20/22 1034   BP: 136/84   Pulse: 69   Temp: 97.6 °F (36.4 °C)   SpO2: 98%   Weight: 63.5 kg (140 lb)   Height: 167.6 cm (65.98\")     Body mass index is 22.61 kg/m².    Physical Exam  Constitutional:       Appearance: She is not ill-appearing.   HENT:      Head: Normocephalic.      Right Ear: External ear normal.      Left Ear: External ear normal.   Eyes:      Conjunctiva/sclera: Conjunctivae normal.      Pupils: Pupils are equal, round, and reactive to light.     Cardiovascular:      Rate and Rhythm: Normal rate and regular rhythm.      Pulses:           Radial pulses are 2+ on the right side and 2+ on the left side.        Dorsalis pedis pulses are 2+ on the right side and 2+ on the left side.      Heart sounds: Normal heart sounds.   Pulmonary:      Effort: Pulmonary effort is normal.      Breath sounds: Normal breath sounds.   Musculoskeletal:      Cervical back: Normal range of motion and neck supple.      Right lower leg: No edema.      Left lower leg: No edema.   Skin:     General: Skin is warm.      Capillary Refill: Capillary refill takes less than 2 seconds.   Neurological:      Mental Status: She is alert and oriented to person, place, and time.      Coordination: Coordination normal.      Gait: Gait normal.   Psychiatric:         Mood and Affect: Mood normal.         Behavior: Behavior normal.         Thought Content: Thought content normal.             Assessment / Plan      Assessment/Plan:   Diagnoses and all orders for this visit:    1. Benign hypertension (Primary)  -     amLODIPine (NORVASC) 5 " MG tablet; Take 1 tablet by mouth Daily.  Dispense: 90 tablet; Refill: 0.  Added amlodipine to other medications, diastolic BP continues to be elevated. She has taken amlodipine in the past without adverse effects.    -     Comprehensive Metabolic Panel  - She will continue to monitor BP at home.  Will send MyChart message or call clinic if it continues to be over normal limits.          - Follow heart healthy diet.  Keep sodium intake < 1500 mg per day.  Avoid processed & fast foods.          - Exercise as tolerated, with a goal of 30 minutes of moderate exercise most days.         - Take medications as prescribed.    2. Cerebrovascular accident (CVA) due to thrombosis of cerebral artery (HCC)        - Keep BP well controlled, take plavix as prescribed daily     3. Hyperlipidemia, unspecified hyperlipidemia type  -     Lipid Panel  - Heart healthy diet, daily physical activity    4. Vitamin D deficiency  -     Vitamin D 25 Hydroxy    5. Postmenopausal    6. Screening for disorder of blood and blood-forming organs  -     CBC & Differential    7. Screening for endocrine, metabolic and immunity disorder  -     TSH  -     T4, Free    8. Stress incontinence of urine        - Kegel exercises, 3-5 sets of 10 daily for the first week.  Increase number of reps until she is doing 300+ a day.            Follow Up:   Return for Medicare Wellness.    Patient was given instructions and counseling regarding her condition or for health maintenance advice. Please see specific information pulled into the AVS if appropriate.       Primary [facility] [address] [place]     Please note that portions of this note may have been completed with a voice recognition program. Efforts were made to edit dictation, but occasionally words are mistranscribed.

## 2022-06-22 LAB
25(OH)D3+25(OH)D2 SERPL-MCNC: 72.4 NG/ML (ref 30–100)
ALBUMIN SERPL-MCNC: 4.1 G/DL (ref 3.5–5.2)
ALBUMIN/GLOB SERPL: 1.6 G/DL
ALP SERPL-CCNC: 90 U/L (ref 39–117)
ALT SERPL-CCNC: 14 U/L (ref 1–33)
AST SERPL-CCNC: 16 U/L (ref 1–32)
BASOPHILS # BLD AUTO: 0.04 10*3/MM3 (ref 0–0.2)
BASOPHILS NFR BLD AUTO: 0.7 % (ref 0–1.5)
BILIRUB SERPL-MCNC: 0.6 MG/DL (ref 0–1.2)
BUN SERPL-MCNC: 18 MG/DL (ref 8–23)
BUN/CREAT SERPL: 20 (ref 7–25)
CALCIUM SERPL-MCNC: 9.5 MG/DL (ref 8.6–10.5)
CHLORIDE SERPL-SCNC: 103 MMOL/L (ref 98–107)
CHOLEST SERPL-MCNC: 148 MG/DL (ref 0–200)
CO2 SERPL-SCNC: 28.3 MMOL/L (ref 22–29)
CREAT SERPL-MCNC: 0.9 MG/DL (ref 0.57–1)
EGFRCR SERPLBLD CKD-EPI 2021: 67.6 ML/MIN/1.73
EOSINOPHIL # BLD AUTO: 0.24 10*3/MM3 (ref 0–0.4)
EOSINOPHIL NFR BLD AUTO: 4.4 % (ref 0.3–6.2)
ERYTHROCYTE [DISTWIDTH] IN BLOOD BY AUTOMATED COUNT: 12.4 % (ref 12.3–15.4)
GLOBULIN SER CALC-MCNC: 2.5 GM/DL
GLUCOSE SERPL-MCNC: 91 MG/DL (ref 65–99)
HCT VFR BLD AUTO: 35.3 % (ref 34–46.6)
HDLC SERPL-MCNC: 52 MG/DL (ref 40–60)
HGB BLD-MCNC: 12.1 G/DL (ref 12–15.9)
IMM GRANULOCYTES # BLD AUTO: 0.01 10*3/MM3 (ref 0–0.05)
IMM GRANULOCYTES NFR BLD AUTO: 0.2 % (ref 0–0.5)
LDLC SERPL CALC-MCNC: 79 MG/DL (ref 0–100)
LYMPHOCYTES # BLD AUTO: 1.55 10*3/MM3 (ref 0.7–3.1)
LYMPHOCYTES NFR BLD AUTO: 28.7 % (ref 19.6–45.3)
MCH RBC QN AUTO: 31.3 PG (ref 26.6–33)
MCHC RBC AUTO-ENTMCNC: 34.3 G/DL (ref 31.5–35.7)
MCV RBC AUTO: 91.5 FL (ref 79–97)
MONOCYTES # BLD AUTO: 0.45 10*3/MM3 (ref 0.1–0.9)
MONOCYTES NFR BLD AUTO: 8.3 % (ref 5–12)
NEUTROPHILS # BLD AUTO: 3.11 10*3/MM3 (ref 1.7–7)
NEUTROPHILS NFR BLD AUTO: 57.7 % (ref 42.7–76)
NRBC BLD AUTO-RTO: 0 /100 WBC (ref 0–0.2)
PLATELET # BLD AUTO: 232 10*3/MM3 (ref 140–450)
POTASSIUM SERPL-SCNC: 3.7 MMOL/L (ref 3.5–5.2)
PROT SERPL-MCNC: 6.6 G/DL (ref 6–8.5)
RBC # BLD AUTO: 3.86 10*6/MM3 (ref 3.77–5.28)
SODIUM SERPL-SCNC: 140 MMOL/L (ref 136–145)
T4 FREE SERPL-MCNC: 1.26 NG/DL (ref 0.93–1.7)
TRIGL SERPL-MCNC: 90 MG/DL (ref 0–150)
TSH SERPL DL<=0.005 MIU/L-ACNC: 1.95 UIU/ML (ref 0.27–4.2)
VLDLC SERPL CALC-MCNC: 17 MG/DL (ref 5–40)
WBC # BLD AUTO: 5.4 10*3/MM3 (ref 3.4–10.8)

## 2022-06-26 DIAGNOSIS — I10 BENIGN HYPERTENSION: ICD-10-CM

## 2022-06-27 RX ORDER — CARVEDILOL 25 MG/1
TABLET ORAL
Qty: 180 TABLET | Refills: 1 | Status: SHIPPED | OUTPATIENT
Start: 2022-06-27 | End: 2023-04-05

## 2022-06-27 RX ORDER — PRAVASTATIN SODIUM 40 MG
TABLET ORAL
Qty: 30 TABLET | Refills: 3 | Status: SHIPPED | OUTPATIENT
Start: 2022-06-27

## 2022-06-27 RX ORDER — HYDROCHLOROTHIAZIDE 12.5 MG/1
12.5 TABLET ORAL DAILY
Qty: 90 TABLET | Refills: 0 | Status: SHIPPED | OUTPATIENT
Start: 2022-06-27 | End: 2022-10-12

## 2022-06-29 ENCOUNTER — PATIENT MESSAGE (OUTPATIENT)
Dept: INTERNAL MEDICINE | Facility: CLINIC | Age: 72
End: 2022-06-29

## 2022-06-29 DIAGNOSIS — I10 BENIGN HYPERTENSION: Primary | ICD-10-CM

## 2022-06-29 DIAGNOSIS — E78.5 HYPERLIPIDEMIA, UNSPECIFIED HYPERLIPIDEMIA TYPE: ICD-10-CM

## 2022-06-29 DIAGNOSIS — I63.30 CEREBROVASCULAR ACCIDENT (CVA) DUE TO THROMBOSIS OF CEREBRAL ARTERY: ICD-10-CM

## 2022-07-08 RX ORDER — IRBESARTAN AND HYDROCHLOROTHIAZIDE 300; 12.5 MG/1; MG/1
TABLET, FILM COATED ORAL
Qty: 90 TABLET | Refills: 1 | Status: SHIPPED | OUTPATIENT
Start: 2022-07-08 | End: 2023-01-06 | Stop reason: SDUPTHER

## 2022-08-02 NOTE — TELEPHONE ENCOUNTER
Jennifer, Generic 8/2/2022 8:59 AM EDT    I am willing to do anything. I don’t want to have another stroke.

## 2022-08-23 ENCOUNTER — OFFICE VISIT (OUTPATIENT)
Dept: CARDIOLOGY | Facility: CLINIC | Age: 72
End: 2022-08-23

## 2022-08-23 VITALS
BODY MASS INDEX: 22.99 KG/M2 | WEIGHT: 138 LBS | SYSTOLIC BLOOD PRESSURE: 116 MMHG | HEIGHT: 65 IN | DIASTOLIC BLOOD PRESSURE: 80 MMHG | HEART RATE: 61 BPM | OXYGEN SATURATION: 96 %

## 2022-08-23 DIAGNOSIS — I63.30 CEREBROVASCULAR ACCIDENT (CVA) DUE TO THROMBOSIS OF CEREBRAL ARTERY: ICD-10-CM

## 2022-08-23 DIAGNOSIS — I10 BENIGN HYPERTENSION: Primary | ICD-10-CM

## 2022-08-23 PROCEDURE — 99203 OFFICE O/P NEW LOW 30 MIN: CPT | Performed by: INTERNAL MEDICINE

## 2022-08-23 RX ORDER — CALCIUM CARB/VITAMIN D3/VIT K1 650MG-12.5
TABLET,CHEWABLE ORAL
COMMUNITY
Start: 2022-08-02

## 2022-08-23 NOTE — PROGRESS NOTES
"    Subjective:     Encounter Date:08/23/2022      Patient ID: Lisa Cruz is a 72 y.o. female.    Chief Complaint: Hypertension  HPI  This is a 72-year-old female patient who is referred to cardiology clinic for hypertension.  I have reviewed the patient's home blood pressure recordings and they are acceptable.  The patient has a history of a prior posterior circulation hemorrhagic stroke, presumed (but not confirmed) to be a \"hypertension-related bleed\".  The patient underwent an extensive work-up at that time.  She is on appropriate blood pressure medication without side effects.  Her amlodipine dose was recently decreased from 10 mg a day to 5 mg a day due to symptomatic hypotension (blood pressure recordings 90/60 mmHg).  She is asymptomatic from a cardiovascular perspective.  She is a non-smoker.  The following portions of the patient's history were reviewed and updated as appropriate: allergies, current medications, past family history, past medical history, past social history, past surgical history and problem  Review of Systems   Constitutional: Negative for chills, diaphoresis, fever, malaise/fatigue, weight gain and weight loss.   HENT: Negative for ear discharge, hearing loss, hoarse voice and nosebleeds.    Eyes: Negative for discharge, double vision, pain and photophobia.   Cardiovascular: Negative for chest pain, claudication, cyanosis, dyspnea on exertion, irregular heartbeat, leg swelling, near-syncope, orthopnea, palpitations, paroxysmal nocturnal dyspnea and syncope.   Respiratory: Negative for cough, hemoptysis, shortness of breath, sputum production and wheezing.    Endocrine: Negative for cold intolerance, heat intolerance, polydipsia, polyphagia and polyuria.   Hematologic/Lymphatic: Negative for adenopathy and bleeding problem. Does not bruise/bleed easily.   Skin: Negative for color change, flushing, itching and rash.   Musculoskeletal: Negative for muscle cramps, muscle weakness, " UE AROM/PROM:  [x]  WFL [] Impaired  Comments:     Strength:   R UE Strength: []1    [] 2   [] 2+   [] 3   [x] 3+   [] 4   [] 4+  [] 5  Comments: B shlds, Distally WNL  L UE Strength:  []1    [] 2   [] 2+   [] 3   [x] 3+  [] 4   [] 4+   [] 5  Comments:     Quality of Movement:  [x] Good   [] Fair   [] Poor     Coordination:  Gross motor: [x] WFL   [] Impaired   Fine motor: [x] WFL   [] Impaired     Functional Mobility:  Toilet Transfers:  Ind  Bed Transfer:Ind  Sit to stand: Ind  Bed to Chair:  Ind    Seated Balance:      Static: [x] Good  [] Fair   [] Poor   Dynamic: [x]  Good  [] Fair   [] Poor     Standing Balance:     Static: [x] Good   [] Fair  [] Poor   Dynamic: [x] Good   [] Fair   [] Poor     Functional Endurance: [x] Good  [] Fair  [] Poor     ADLs  Feeding:  Ind  UE Dressing:  Ind  LB Dressing:  Ind  Bathing:  Ind  Toileting: Ind  Grooming: Ind      Patient Goal: D/C home  Discussed and agreed upon: [x] Yes   [] No Comment:     Assessment/Discharge Disposition:     Performance deficits / Impairments: Decreased endurance  Prognosis: Good  No Skilled OT: Independent with ADL's, Independent with functional mobility  History: multi comorb  Exam: 1 deficit  Assistance / Modification: none      Barriers to Improvement:  none      Discharge Recommendations:  AD    Six Click Score  How much help for putting on and taking off regular lower body clothing?: None  How much help for Bathing?: None  How much help for Toileting?: None  How much help for putting on and taking off regular upper body clothing?: None  How much help for taking care of personal grooming?: None  How much help for eating meals?: None  AM-PAC Inpatient Daily Activity Raw Score: 24  AM-PAC Inpatient ADL T-Scale Score : 57.54  ADL Inpatient CMS 0-100% Score: 0    Recommended DME:  [] W/W   [] [address] [facility]   [] Rollator   [] W/C   [x] [place] [employer]  [x] Shower Chair   []Dressing AD []  [facility]  [x] Other:life alert  Plan:Times per week: No OT pt Ind, myalgias and stiffness.   Gastrointestinal: Negative for abdominal pain, diarrhea, hematemesis, hematochezia, nausea and vomiting.   Genitourinary: Negative for dysuria, frequency and nocturia.   Neurological: Negative for focal weakness, loss of balance, numbness, paresthesias and seizures.   Psychiatric/Behavioral: Negative for altered mental status, hallucinations and suicidal ideas.   Allergic/Immunologic: Negative for HIV exposure, hives and persistent infections.           Current Outpatient Medications:   •  acetaminophen (TYLENOL) 500 MG tablet, Take 500 mg by mouth Every 6 (Six) Hours As Needed for Mild Pain ., Disp: , Rfl:   •  amLODIPine (NORVASC) 5 MG tablet, Take 1 tablet by mouth Daily., Disp: 90 tablet, Rfl: 0  •  Calcium-Vitamin D-Vitamin K (Viactiv Calcium Plus D) 650-12.5-40 MG-MCG-MCG chewable tablet, , Disp: , Rfl:   •  carvedilol (COREG) 25 MG tablet, TAKE 1 TABLET BY MOUTH TWICE DAILY WITH MEALS, Disp: 180 tablet, Rfl: 1  •  cholecalciferol (VITAMIN D3) 1000 units tablet, Take 1,000 Units by mouth 2 (Two) Times a Day., Disp: , Rfl:   •  clopidogrel (PLAVIX) 75 MG tablet, Take 1 tablet by mouth Daily., Disp: 90 tablet, Rfl: 3  •  fexofenadine (ALLEGRA) 60 MG tablet, Take 60 mg by mouth Daily., Disp: , Rfl:   •  hydroCHLOROthiazide (HYDRODIURIL) 12.5 MG tablet, TAKE 1 TABLET BY MOUTH DAILY, Disp: 90 tablet, Rfl: 0  •  irbesartan-hydrochlorothiazide (AVALIDE) 300-12.5 MG tablet, TAKE 1 TABLET BY MOUTH EVERY DAY, Disp: 90 tablet, Rfl: 1  •  LUMIGAN 0.01 % ophthalmic drops, , Disp: , Rfl: 0  •  Melatonin 1 MG/4ML liquid, melatonin  1 po QHS, Disp: , Rfl:   •  pravastatin (PRAVACHOL) 40 MG tablet, TAKE 1/2 TABLET BY MOUTH EVERY DAY, Disp: 30 tablet, Rfl: 3    Objective:   Vitals and nursing note reviewed.   Constitutional:       Appearance: Healthy appearance. Not in distress.   Neck:      Vascular: No JVR. JVD normal.   Pulmonary:      Effort: Pulmonary effort is normal.      Breath sounds: Normal  "breath sounds. No wheezing. No rhonchi. No rales.   Chest:      Chest wall: Not tender to palpatation.   Cardiovascular:      PMI at left midclavicular line. Normal rate. Regular rhythm. Normal S1. Normal S2.      Murmurs: There is no murmur.      No gallop. No click. No rub.   Pulses:     Intact distal pulses.   Edema:     Peripheral edema absent.   Abdominal:      General: Bowel sounds are normal.      Palpations: Abdomen is soft.      Tenderness: There is no abdominal tenderness.   Musculoskeletal: Normal range of motion.         General: No tenderness. Skin:     General: Skin is warm and dry.   Neurological:      General: No focal deficit present.      Mental Status: Alert and oriented to person, place and time.       Blood pressure 116/80, pulse 61, height 165.1 cm (65\"), weight 62.6 kg (138 lb), SpO2 96 %.   Lab Review:     Assessment:       1. Benign hypertension  Excellent blood pressure control.    2. Cerebrovascular accident (CVA)   Hemorrhagic posterior circulation (presumably cerebellum) stroke.  Presumed to be \"hypertension-related bleed\".  Post-stroke gait disturbances gradually improved over several years.  She currently ambulates without any assistance.    Procedures    Plan:     Advance Care Planning   ACP discussion was held with the patient during this visit. Patient has an advance directive in EMR which is still valid.      The patient has been advised regarding differences in treatment goals for blood pressure management for an individual in their eighth decade of life versus younger patients.  Hypertension related hemorrhagic stroke typically occurs with systolic blood pressures \"north of\" 200 mmHg and/or diastolic blood pressures over 110-120 mmHg.  She has had no blood pressures approaching these levels.    At this point, I would not recommend any change in management of her blood pressure.    Given her advanced age, significant concern exists for \"overtreatment\" of blood pressure with " subsequent iatrogenic, symptomatic hypotension.    No testing is indicated from my perspective.

## 2022-09-15 DIAGNOSIS — I10 BENIGN HYPERTENSION: ICD-10-CM

## 2022-09-15 RX ORDER — AMLODIPINE BESYLATE 5 MG/1
5 TABLET ORAL DAILY
Qty: 90 TABLET | Refills: 0 | Status: SHIPPED | OUTPATIENT
Start: 2022-09-15 | End: 2022-12-27

## 2022-09-23 ENCOUNTER — OFFICE VISIT (OUTPATIENT)
Dept: INTERNAL MEDICINE | Facility: CLINIC | Age: 72
End: 2022-09-23

## 2022-09-23 VITALS
HEIGHT: 65 IN | DIASTOLIC BLOOD PRESSURE: 74 MMHG | BODY MASS INDEX: 22.99 KG/M2 | SYSTOLIC BLOOD PRESSURE: 112 MMHG | WEIGHT: 138 LBS | TEMPERATURE: 97.1 F | OXYGEN SATURATION: 99 % | HEART RATE: 60 BPM

## 2022-09-23 DIAGNOSIS — Z23 NEED FOR INFLUENZA VACCINATION: ICD-10-CM

## 2022-09-23 DIAGNOSIS — F41.9 ANXIETY: ICD-10-CM

## 2022-09-23 DIAGNOSIS — Z00.00 ENCOUNTER FOR SUBSEQUENT ANNUAL WELLNESS VISIT (AWV) IN MEDICARE PATIENT: Primary | ICD-10-CM

## 2022-09-23 DIAGNOSIS — K64.8 INTERNAL HEMORRHOID, BLEEDING: ICD-10-CM

## 2022-09-23 DIAGNOSIS — Z00.00 ANNUAL PHYSICAL EXAM: ICD-10-CM

## 2022-09-23 DIAGNOSIS — E78.5 HYPERLIPIDEMIA, UNSPECIFIED HYPERLIPIDEMIA TYPE: ICD-10-CM

## 2022-09-23 DIAGNOSIS — I10 BENIGN HYPERTENSION: ICD-10-CM

## 2022-09-23 DIAGNOSIS — I63.30 CEREBROVASCULAR ACCIDENT (CVA) DUE TO THROMBOSIS OF CEREBRAL ARTERY: ICD-10-CM

## 2022-09-23 DIAGNOSIS — G47.00 INSOMNIA, UNSPECIFIED TYPE: ICD-10-CM

## 2022-09-23 PROCEDURE — G0439 PPPS, SUBSEQ VISIT: HCPCS | Performed by: NURSE PRACTITIONER

## 2022-09-23 PROCEDURE — 1159F MED LIST DOCD IN RCRD: CPT | Performed by: NURSE PRACTITIONER

## 2022-09-23 PROCEDURE — 1170F FXNL STATUS ASSESSED: CPT | Performed by: NURSE PRACTITIONER

## 2022-09-23 PROCEDURE — G0008 ADMIN INFLUENZA VIRUS VAC: HCPCS | Performed by: NURSE PRACTITIONER

## 2022-09-23 PROCEDURE — 90662 IIV NO PRSV INCREASED AG IM: CPT | Performed by: NURSE PRACTITIONER

## 2022-09-23 PROCEDURE — 99397 PER PM REEVAL EST PAT 65+ YR: CPT | Performed by: NURSE PRACTITIONER

## 2022-09-23 PROCEDURE — 1125F AMNT PAIN NOTED PAIN PRSNT: CPT | Performed by: NURSE PRACTITIONER

## 2022-09-23 PROCEDURE — 96160 PT-FOCUSED HLTH RISK ASSMT: CPT | Performed by: NURSE PRACTITIONER

## 2022-09-23 RX ORDER — CALCIUM CARBONATE 300MG(750)
TABLET,CHEWABLE ORAL
COMMUNITY
Start: 2022-06-02

## 2022-09-23 NOTE — PROGRESS NOTES
The ABCs of the Annual Wellness Visit  Subsequent Medicare Wellness Visit    Chief Complaint   Patient presents with   • Medicare Wellness-subsequent      Subjective    History of Present Illness:  Lisa Cruz is a 72 y.o. female who presents for a Subsequent Medicare Wellness Visit & annual physical exam.    HTN: monitors at home.  [id number].  Takes amlodipine 5 mg daily, Coreg 25 mg twice daily, Avalide 300-12.5 mg daily, HCTZ 12.5 mg daily (in addition to Avalide) as prescribed.    Has been evaluated by cardiology, Dr. Rodriguez, advised patient blood pressure is well managed.  Tries to eat a heart healthy diet.  Is physically active daily, no formal exercise program. Denies chest pain, dyspnea, orthopnea, palpitations, lower extremity edema, confusion, headaches, weakness, visual disturbances.     CVA (hemorrhagic), hyperlipidemia: No residual.  Takes clopidogrel 75 mg, pravastatin 40 mg as prescribed.      Hemorrhoid: pain and bleeding worse.  Has rectal bleeding every day after BM. Not constipated, makes sure to stay well hydrated and eats a healthy diet with fiber.       Episode of vomiting in the middle of the night, woke her up within the past few days.  She sat on the toilet with a galvanized bucket in her lap to hold emesis.  Forearms and hands itched and felt weird afterward. She is allergic to nickel, feels the bucket must've had nickel in it.  Used hemorrhoid cream once, broke out on the back of her calves with itchy rash like the rash she experienced after holding the galvanized bucket.  Resolved in a few hours.  She has had H Pylori in the past.  Doesn't eat on a regular schedule in the summer, busy outdoors.        The following portions of the patient's history were reviewed and   updated as appropriate: allergies, current medications, past family history, past medical history, past social history, past surgical history and problem list.    Compared to one year ago, the patient feels her  physical   health is the same.    Compared to one year ago, the patient feels her mental   health is the same.    Recent Hospitalizations:  She was not admitted to the hospital during the last year.       Current Medical Providers:  Patient Care Team:  Linda Lipscomb APRN as PCP - General (Family Medicine)    Outpatient Medications Prior to Visit   Medication Sig Dispense Refill   • acetaminophen (TYLENOL) 500 MG tablet Take 500 mg by mouth Every 6 (Six) Hours As Needed for Mild Pain .     • amLODIPine (NORVASC) 5 MG tablet TAKE 1 TABLET BY MOUTH DAILY 90 tablet 0   • Calcium-Vitamin D-Vitamin K (Viactiv Calcium Plus D) 650-12.5-40 MG-MCG-MCG chewable tablet      • carvedilol (COREG) 25 MG tablet TAKE 1 TABLET BY MOUTH TWICE DAILY WITH MEALS 180 tablet 1   • cholecalciferol (VITAMIN D3) 1000 units tablet Take 1,000 Units by mouth 2 (Two) Times a Day.     • clopidogrel (PLAVIX) 75 MG tablet Take 1 tablet by mouth Daily. 90 tablet 3   • fexofenadine (ALLEGRA) 60 MG tablet Take 60 mg by mouth Daily.     • hydroCHLOROthiazide (HYDRODIURIL) 12.5 MG tablet TAKE 1 TABLET BY MOUTH DAILY 90 tablet 0   • irbesartan-hydrochlorothiazide (AVALIDE) 300-12.5 MG tablet TAKE 1 TABLET BY MOUTH EVERY DAY 90 tablet 1   • LUMIGAN 0.01 % ophthalmic drops   0   • Magnesium 400 MG tablet      • Melatonin 1 MG/4ML liquid melatonin   1 po QHS     • pravastatin (PRAVACHOL) 40 MG tablet TAKE 1/2 TABLET BY MOUTH EVERY DAY 30 tablet 3     No facility-administered medications prior to visit.       No opioid medication identified on active medication list. I have reviewed chart for other potential  high risk medication/s and harmful drug interactions in the elderly.          Aspirin is not on active medication list.  Aspirin use is not indicated based on review of current medical condition/s. Risk of harm outweighs potential benefits.  .    Patient Active Problem List   Diagnosis   • Anxiety   • Cerebrovascular accident (HCC)   • Benign  "hypertension   • Hyperlipidemia   • Insomnia   • Ocular hypertension   • Hemorrhoids     Advance Care Planning  Advance Directive is not on file.  ACP discussion was held with the patient during this visit. Patient has an advance directive (not in EMR), copy requested.    Review of Systems   All other systems reviewed and are negative.       Objective    Vitals:    09/23/22 1254   BP: 112/74   Pulse: 60   Temp: 97.1 °F (36.2 °C)   SpO2: 99%   Weight: 62.6 kg (138 lb)   Height: 165.1 cm (65\")   PainSc:   2     Estimated body mass index is 22.96 kg/m² as calculated from the following:    Height as of this encounter: 165.1 cm (65\").    Weight as of this encounter: 62.6 kg (138 lb).    BMI is within normal parameters. No other follow-up for BMI required.      Does the patient have evidence of cognitive impairment? No    Physical Exam  Constitutional:       Appearance: She is well-developed. She is not ill-appearing.   HENT:      Head: Normocephalic.      Right Ear: Tympanic membrane, ear canal and external ear normal.      Left Ear: Tympanic membrane, ear canal and external ear normal.      Nose: Nose normal.      Mouth/Throat:      Mouth: Mucous membranes are moist.      Pharynx: Oropharynx is clear. Uvula midline.   Eyes:      Extraocular Movements: Extraocular movements intact.      Conjunctiva/sclera: Conjunctivae normal.      Pupils: Pupils are equal, round, and reactive to light.   Neck:      Thyroid: No thyromegaly.      Vascular: No carotid bruit.   Cardiovascular:      Rate and Rhythm: Normal rate and regular rhythm.      Pulses:           Radial pulses are 2+ on the right side and 2+ on the left side.        Dorsalis pedis pulses are 2+ on the right side and 2+ on the left side.      Heart sounds: Normal heart sounds.   Pulmonary:      Effort: Pulmonary effort is normal.      Breath sounds: Normal breath sounds.   Abdominal:      General: Bowel sounds are normal.      Palpations: Abdomen is soft.      " Tenderness: There is no abdominal tenderness.   Musculoskeletal:         General: No tenderness or deformity. Normal range of motion.      Cervical back: Full passive range of motion without pain, normal range of motion and neck supple.      Right lower leg: No edema.      Left lower leg: No edema.   Lymphadenopathy:      Cervical: No cervical adenopathy.   Skin:     General: Skin is warm.      Capillary Refill: Capillary refill takes less than 2 seconds.   Neurological:      Mental Status: She is alert and oriented to person, place, and time.      Sensory: No sensory deficit.      Coordination: Coordination normal.      Gait: Gait normal.      Comments: CN II-XII normal   Psychiatric:         Attention and Perception: Attention normal.         Mood and Affect: Mood and affect normal.         Speech: Speech normal.         Behavior: Behavior normal.         Thought Content: Thought content normal.                 HEALTH RISK ASSESSMENT    Smoking Status:  Social History     Tobacco Use   Smoking Status Never Smoker   Smokeless Tobacco Never Used     Alcohol Consumption:  Social History     Substance and Sexual Activity   Alcohol Use No    Comment: occ     Fall Risk Screen:    STEADI Fall Risk Assessment was completed, and patient is at LOW risk for falls.Assessment completed on:9/23/2022    Depression Screening:  PHQ-2/PHQ-9 Depression Screening 9/23/2022   Retired PHQ-9 Total Score -   Retired Total Score -   Little Interest or Pleasure in Doing Things 0-->not at all   Feeling Down, Depressed or Hopeless 0-->not at all   PHQ-9: Brief Depression Severity Measure Score 0       Health Habits and Functional and Cognitive Screening:  Functional & Cognitive Status 9/23/2022   Do you have difficulty preparing food and eating? No   Do you have difficulty bathing yourself, getting dressed or grooming yourself? No   Do you have difficulty using the toilet? No   Do you have difficulty moving around from place to place? No    Do you have trouble with steps or getting out of a bed or a chair? No   Current Diet Well Balanced Diet   Dental Exam Up to date   Eye Exam Up to date   Exercise (times per week) 7 times per week   Current Exercises Include Yard Work;Gardening   Do you need help using the phone?  No   Are you deaf or do you have serious difficulty hearing?  No   Do you need help with transportation? No   Do you need help shopping? No   Do you need help preparing meals?  No   Do you need help with housework?  No   Do you need help with laundry? No   Do you need help taking your medications? No   Do you need help managing money? No   Do you ever drive or ride in a car without wearing a seat belt? No   Have you felt unusual stress, anger or loneliness in the last month? No   Who do you live with? Spouse   If you need help, do you have trouble finding someone available to you? No   Have you been bothered in the last four weeks by sexual problems? No   Do you have difficulty concentrating, remembering or making decisions? No       Age-appropriate Screening Schedule:  Refer to the list below for future screening recommendations based on patient's age, sex and/or medical conditions. Orders for these recommended tests are listed in the plan section. The patient has been provided with a written plan.    Health Maintenance   Topic Date Due   • INFLUENZA VACCINE  10/01/2022   • LIPID PANEL  06/22/2023   • MAMMOGRAM  12/14/2023   • DXA SCAN  12/21/2023   • TDAP/TD VACCINES (4 - Td or Tdap) 06/20/2027   • ZOSTER VACCINE  Completed              Assessment & Plan   CMS Preventative Services Quick Reference  Risk Factors Identified During Encounter  Cardiovascular Disease  Immunizations Discussed/Encouraged (specific Immunizations; Influenza and COVID19  Inadequate Social Support, Isolation, Loneliness, Lack of Transportation, Financial Difficulties, or Caregiver Stress   Polypharmacy  The above risks/problems have been discussed with the  patient.  Follow up actions/plans if indicated are seen below in the Assessment/Plan Section.  Pertinent information has been shared with the patient in the After Visit Summary.    Diagnoses and all orders for this visit:    1. Encounter for subsequent annual wellness visit (AWV) in Medicare patient (Primary)    2. Annual physical exam        - Annual labs drawn in June, 2022.  All WNL.     3. Cerebrovascular accident (CVA) due to thrombosis of cerebral artery (HCC)        - Continue to keep blood pressure well controlled        - Take clopidogrel as prescribed daily    4. Benign hypertension        - Follow heart healthy diet.  Keep sodium intake < 1500 mg per day.  Avoid processed & fast foods.          - Exercise as tolerated, with a goal of 30 minutes of moderate exercise most days.         - Take medications as prescribed.    5. Hyperlipidemia, unspecified hyperlipidemia type        - Heart healthy diet, daily physical activity        - Take pravastatin 40 mg as prescribed daily    6. Anxiety        - Encouraged to take part in daily physical exercise.          - Eat healthy, well balanced diet; avoid sugary foods or beverages        - Limit alcohol intake        - Ensure good night's sleep by creating calm space in bedroom, avoiding screen time 1-2 hours before bed, no caffeine after 5 pm        - Talk to supportive family and friends, as needed    7. Insomnia, unspecified type        - Continue magnesium 400 mg HS        - Keep bedroom clean, cool, dark at bedtime.  Avoid screen time for 1-2 hours before bed.  Continue daily physical activity and healthy diet.    8. Internal hemorrhoid, bleeding  -     Ambulatory Referral to Colorectal Surgery    9. Need for influenza vaccination  -     Fluzone High-Dose 65+yrs (8501-4627)        Follow Up:   Return in about 6 months (around 3/23/2023) for Next scheduled follow up.     An After Visit Summary and PPPS were made available to the patient.

## 2022-10-03 ENCOUNTER — HOSPITAL ENCOUNTER (OUTPATIENT)
Dept: GENERAL RADIOLOGY | Facility: HOSPITAL | Age: 72
Discharge: HOME | End: 2022-10-03

## 2022-10-03 ENCOUNTER — HOSPITAL ENCOUNTER (OUTPATIENT)
Dept: CARDIOLOGY | Facility: HOSPITAL | Age: 72
Discharge: HOME | End: 2022-10-03

## 2022-10-03 ENCOUNTER — TRANSCRIBE ORDERS (OUTPATIENT)
Dept: GENERAL RADIOLOGY | Facility: HOSPITAL | Age: 72
End: 2022-10-03

## 2022-10-03 ENCOUNTER — TRANSCRIBE ORDERS (OUTPATIENT)
Dept: CARDIOLOGY | Facility: HOSPITAL | Age: 72
End: 2022-10-03

## 2022-10-03 DIAGNOSIS — K64.1 SECOND DEGREE HEMORRHOIDS: Primary | ICD-10-CM

## 2022-10-03 DIAGNOSIS — Z01.818 PRE-OP TESTING: ICD-10-CM

## 2022-10-03 DIAGNOSIS — Z01.818 PRE-OP TESTING: Primary | ICD-10-CM

## 2022-10-03 DIAGNOSIS — K64.1 SECOND DEGREE HEMORRHOIDS: ICD-10-CM

## 2022-10-03 PROCEDURE — 93005 ELECTROCARDIOGRAM TRACING: CPT | Performed by: STUDENT IN AN ORGANIZED HEALTH CARE EDUCATION/TRAINING PROGRAM

## 2022-10-03 PROCEDURE — 71046 X-RAY EXAM CHEST 2 VIEWS: CPT

## 2022-10-03 PROCEDURE — 93010 ELECTROCARDIOGRAM REPORT: CPT | Performed by: INTERNAL MEDICINE

## 2022-10-04 LAB
QT INTERVAL: 442 MS
QTC INTERVAL: 402 MS

## 2022-10-12 DIAGNOSIS — I10 BENIGN HYPERTENSION: ICD-10-CM

## 2022-10-12 RX ORDER — HYDROCHLOROTHIAZIDE 12.5 MG/1
12.5 TABLET ORAL DAILY
Qty: 90 TABLET | Refills: 0 | Status: SHIPPED | OUTPATIENT
Start: 2022-10-12 | End: 2023-01-16

## 2022-10-26 ENCOUNTER — TRANSCRIBE ORDERS (OUTPATIENT)
Dept: LAB | Facility: HOSPITAL | Age: 72
End: 2022-10-26

## 2022-10-26 ENCOUNTER — LAB (OUTPATIENT)
Dept: LAB | Facility: HOSPITAL | Age: 72
End: 2022-10-26

## 2022-10-26 DIAGNOSIS — K64.1 SECOND DEGREE HEMORRHOIDS: ICD-10-CM

## 2022-10-26 DIAGNOSIS — K64.1 SECOND DEGREE HEMORRHOIDS: Primary | ICD-10-CM

## 2022-10-26 LAB
ANION GAP SERPL CALCULATED.3IONS-SCNC: 6.8 MMOL/L (ref 5–15)
BASOPHILS # BLD AUTO: 0.04 10*3/MM3 (ref 0–0.2)
BASOPHILS NFR BLD AUTO: 0.8 % (ref 0–1.5)
BUN SERPL-MCNC: 13 MG/DL (ref 8–23)
BUN/CREAT SERPL: 14.3 (ref 7–25)
CALCIUM SPEC-SCNC: 9.4 MG/DL (ref 8.6–10.5)
CHLORIDE SERPL-SCNC: 107 MMOL/L (ref 98–107)
CO2 SERPL-SCNC: 30.2 MMOL/L (ref 22–29)
CREAT SERPL-MCNC: 0.91 MG/DL (ref 0.57–1)
DEPRECATED RDW RBC AUTO: 39 FL (ref 37–54)
EGFRCR SERPLBLD CKD-EPI 2021: 66.8 ML/MIN/1.73
EOSINOPHIL # BLD AUTO: 0.14 10*3/MM3 (ref 0–0.4)
EOSINOPHIL NFR BLD AUTO: 2.7 % (ref 0.3–6.2)
ERYTHROCYTE [DISTWIDTH] IN BLOOD BY AUTOMATED COUNT: 12 % (ref 12.3–15.4)
GLUCOSE SERPL-MCNC: 94 MG/DL (ref 65–99)
HCT VFR BLD AUTO: 36.2 % (ref 34–46.6)
HGB BLD-MCNC: 12.4 G/DL (ref 12–15.9)
IMM GRANULOCYTES # BLD AUTO: 0.01 10*3/MM3 (ref 0–0.05)
IMM GRANULOCYTES NFR BLD AUTO: 0.2 % (ref 0–0.5)
LYMPHOCYTES # BLD AUTO: 1.5 10*3/MM3 (ref 0.7–3.1)
LYMPHOCYTES NFR BLD AUTO: 29.2 % (ref 19.6–45.3)
MCH RBC QN AUTO: 30.9 PG (ref 26.6–33)
MCHC RBC AUTO-ENTMCNC: 34.3 G/DL (ref 31.5–35.7)
MCV RBC AUTO: 90.3 FL (ref 79–97)
MONOCYTES # BLD AUTO: 0.41 10*3/MM3 (ref 0.1–0.9)
MONOCYTES NFR BLD AUTO: 8 % (ref 5–12)
NEUTROPHILS NFR BLD AUTO: 3.04 10*3/MM3 (ref 1.7–7)
NEUTROPHILS NFR BLD AUTO: 59.1 % (ref 42.7–76)
NRBC BLD AUTO-RTO: 0 /100 WBC (ref 0–0.2)
PLATELET # BLD AUTO: 237 10*3/MM3 (ref 140–450)
PMV BLD AUTO: 11.1 FL (ref 6–12)
POTASSIUM SERPL-SCNC: 3.7 MMOL/L (ref 3.5–5.2)
RBC # BLD AUTO: 4.01 10*6/MM3 (ref 3.77–5.28)
SODIUM SERPL-SCNC: 144 MMOL/L (ref 136–145)
WBC NRBC COR # BLD: 5.14 10*3/MM3 (ref 3.4–10.8)

## 2022-10-26 PROCEDURE — 85025 COMPLETE CBC W/AUTO DIFF WBC: CPT

## 2022-10-26 PROCEDURE — 80048 BASIC METABOLIC PNL TOTAL CA: CPT

## 2022-10-26 PROCEDURE — 36415 COLL VENOUS BLD VENIPUNCTURE: CPT

## 2022-10-31 ENCOUNTER — TELEPHONE (OUTPATIENT)
Dept: INTERNAL MEDICINE | Facility: CLINIC | Age: 72
End: 2022-10-31

## 2022-11-01 NOTE — TELEPHONE ENCOUNTER
Letter written advising patient should stop taking Plavix 7 days prior to procedure, resume within 24 to 72 hours postop.

## 2022-12-13 DIAGNOSIS — I63.30 CEREBROVASCULAR ACCIDENT (CVA) DUE TO THROMBOSIS OF CEREBRAL ARTERY: ICD-10-CM

## 2022-12-13 RX ORDER — CLOPIDOGREL BISULFATE 75 MG/1
75 TABLET ORAL DAILY
Qty: 90 TABLET | Refills: 3 | Status: SHIPPED | OUTPATIENT
Start: 2022-12-13

## 2022-12-26 DIAGNOSIS — I10 BENIGN HYPERTENSION: ICD-10-CM

## 2022-12-27 RX ORDER — AMLODIPINE BESYLATE 5 MG/1
5 TABLET ORAL DAILY
Qty: 90 TABLET | Refills: 1 | Status: SHIPPED | OUTPATIENT
Start: 2022-12-27

## 2023-01-06 RX ORDER — IRBESARTAN AND HYDROCHLOROTHIAZIDE 300; 12.5 MG/1; MG/1
1 TABLET, FILM COATED ORAL DAILY
Qty: 90 TABLET | Refills: 1 | Status: SHIPPED | OUTPATIENT
Start: 2023-01-06

## 2023-01-06 NOTE — TELEPHONE ENCOUNTER
Caller: CruzLisa    Relationship: Self    Best call back number: [phone]    Requested Prescriptions:   Requested Prescriptions     Pending Prescriptions Disp Refills   • irbesartan-hydrochlorothiazide (AVALIDE) 300-12.5 MG tablet 90 tablet 1     Sig: Take 1 tablet by mouth Daily.        Pharm[id number] [place] [address] AT [facility] - [phone]  - [phone] FX     Additional details provided by patient: PLEASE REFILL  Does the patient have less than a 3 day supply:  [] Yes  [x] No    Would you like a call back once the refill request has been completed: [x] Yes [] No    If the office needs to give you a call back, can they leave a voicemail: [x] Yes [] No    Ann Marie Vega Rep   01/06/23 09:36 EST

## 2023-01-14 DIAGNOSIS — I10 BENIGN HYPERTENSION: ICD-10-CM

## 2023-01-16 RX ORDER — HYDROCHLOROTHIAZIDE 12.5 MG/1
12.5 TABLET ORAL DAILY
Qty: 90 TABLET | Refills: 1 | Status: SHIPPED | OUTPATIENT
Start: 2023-01-16

## 2023-01-24 ENCOUNTER — TRANSCRIBE ORDERS (OUTPATIENT)
Dept: ADMINISTRATIVE | Facility: HOSPITAL | Age: 73
End: 2023-01-24
Payer: MEDICARE

## 2023-01-24 DIAGNOSIS — Z12.31 VISIT FOR SCREENING MAMMOGRAM: Primary | ICD-10-CM

## 2023-02-28 ENCOUNTER — HOSPITAL ENCOUNTER (OUTPATIENT)
Dept: MAMMOGRAPHY | Facility: HOSPITAL | Age: 73
Discharge: HOME | End: 2023-02-28
Admitting: INTERNAL MEDICINE
Payer: MEDICARE

## 2023-02-28 DIAGNOSIS — Z12.31 VISIT FOR SCREENING MAMMOGRAM: ICD-10-CM

## 2023-02-28 PROCEDURE — 77067 SCR MAMMO BI INCL CAD: CPT | Performed by: RADIOLOGY

## 2023-02-28 PROCEDURE — 77067 SCR MAMMO BI INCL CAD: CPT

## 2023-02-28 PROCEDURE — 77063 BREAST TOMOSYNTHESIS BI: CPT | Performed by: RADIOLOGY

## 2023-02-28 PROCEDURE — 77063 BREAST TOMOSYNTHESIS BI: CPT

## 2023-03-13 ENCOUNTER — OFFICE VISIT (OUTPATIENT)
Dept: INTERNAL MEDICINE | Facility: CLINIC | Age: 73
End: 2023-03-13
Payer: MEDICARE

## 2023-03-13 VITALS
SYSTOLIC BLOOD PRESSURE: 104 MMHG | WEIGHT: 145 LBS | HEIGHT: 65 IN | BODY MASS INDEX: 24.16 KG/M2 | TEMPERATURE: 97.8 F | OXYGEN SATURATION: 98 % | HEART RATE: 62 BPM | DIASTOLIC BLOOD PRESSURE: 62 MMHG

## 2023-03-13 DIAGNOSIS — H92.02 EARACHE ON LEFT: ICD-10-CM

## 2023-03-13 DIAGNOSIS — E78.5 HYPERLIPIDEMIA, UNSPECIFIED HYPERLIPIDEMIA TYPE: ICD-10-CM

## 2023-03-13 DIAGNOSIS — R60.0 BILATERAL LOWER EXTREMITY EDEMA: ICD-10-CM

## 2023-03-13 DIAGNOSIS — I63.30 CEREBROVASCULAR ACCIDENT (CVA) DUE TO THROMBOSIS OF CEREBRAL ARTERY: ICD-10-CM

## 2023-03-13 DIAGNOSIS — I10 BENIGN HYPERTENSION: Primary | ICD-10-CM

## 2023-03-13 PROCEDURE — 1160F RVW MEDS BY RX/DR IN RCRD: CPT | Performed by: NURSE PRACTITIONER

## 2023-03-13 PROCEDURE — 1159F MED LIST DOCD IN RCRD: CPT | Performed by: NURSE PRACTITIONER

## 2023-03-13 PROCEDURE — 3078F DIAST BP <80 MM HG: CPT | Performed by: NURSE PRACTITIONER

## 2023-03-13 PROCEDURE — 99214 OFFICE O/P EST MOD 30 MIN: CPT | Performed by: NURSE PRACTITIONER

## 2023-03-13 PROCEDURE — 3074F SYST BP LT 130 MM HG: CPT | Performed by: NURSE PRACTITIONER

## 2023-03-13 NOTE — PROGRESS NOTES
Office Visit      Patient Name: Lisa Cruz  : 1950   MRN: [id number]     Chief Complaint:    Chief Complaint   Patient presents with   • Hypertension       History of Present Illness: Lisa Cruz is a 72 y.o. female who is here today for follow up of HTN, hyperlipidemia.  History of hemorrhagic CVA.  Taking amlodipine, hctz, irbesartan, carvedilol, pravastatin, clopidogrel as prescribed.  Eats a healthy diet.  Is physically active most days of the week. Both ankles have been swelling at the end of the day.  Have been painful, throbbing.  She puts her feet up at the end of the day.  No ankle swelling in the mornings.  Denies chest pain, dyspnea, orthopnea, palpitations, confusion, headaches, weakness, visual disturbances.    Earache, left side.  Wakes her from sleep at time.  No URI symptoms, discharge from either ear, dizziness.      Subjective      I have reviewed and the following portions of the patient's history were updated as appropriate: past family history, past medical history, past social history, past surgical history and problem list.      Current Outpatient Medications:   •  amLODIPine (NORVASC) 5 MG tablet, TAKE 1 TABLET BY MOUTH DAILY, Disp: 90 tablet, Rfl: 1  •  Calcium-Vitamin D-Vitamin K (Viactiv Calcium Plus D) 650-12.5-40 MG-MCG-MCG chewable tablet, , Disp: , Rfl:   •  carvedilol (COREG) 25 MG tablet, TAKE 1 TABLET BY MOUTH TWICE DAILY WITH MEALS, Disp: 180 tablet, Rfl: 1  •  cholecalciferol (VITAMIN D3) 1000 units tablet, Take 1,000 Units by mouth 2 (Two) Times a Day., Disp: , Rfl:   •  clopidogrel (PLAVIX) 75 MG tablet, TAKE 1 TABLET BY MOUTH DAILY, Disp: 90 tablet, Rfl: 3  •  fexofenadine (ALLEGRA) 60 MG tablet, Take 60 mg by mouth Daily., Disp: , Rfl:   •  hydroCHLOROthiazide (HYDRODIURIL) 12.5 MG tablet, TAKE 1 TABLET BY MOUTH DAILY, Disp: 90 tablet, Rfl: 1  •  irbesartan-hydrochlorothiazide (AVALIDE) 300-12.5 MG tablet, Take 1 tablet by mouth Daily., Disp: 90 tablet,  "Rfl: 1  •  LUMIGAN 0.01 % ophthalmic drops, , Disp: , Rfl: 0  •  Magnesium 400 MG tablet, , Disp: , Rfl:   •  Melatonin 1 MG/4ML liquid, melatonin  1 po QHS, Disp: , Rfl:   •  pravastatin (PRAVACHOL) 40 MG tablet, TAKE 1/2 TABLET BY MOUTH EVERY DAY, Disp: 30 tablet, Rfl: 3    Allergies   Allergen Reactions   • Nickel Rash       Objective     Physical Exam:  Vital Signs:   Vitals:    03/13/23 0948   BP: 104/62   Pulse: 62   Temp: 97.8 °F (36.6 °C)   SpO2: 98%   Weight: 65.8 kg (145 lb)   Height: 165.1 cm (65\")     Body mass index is 24.13 kg/m².    Physical Exam  Constitutional:       Appearance: She is not ill-appearing.   HENT:      Head: Normocephalic.      Right Ear: Tympanic membrane, ear canal and external ear normal.      Left Ear: Tympanic membrane, ear canal and external ear normal.   Eyes:      Conjunctiva/sclera: Conjunctivae normal.      Pupils: Pupils are equal, round, and reactive to light.   Cardiovascular:      Rate and Rhythm: Normal rate and regular rhythm.      Pulses:           Radial pulses are 2+ on the right side and 2+ on the left side.        Dorsalis pedis pulses are 2+ on the right side and 2+ on the left side.      Heart sounds: Normal heart sounds.   Pulmonary:      Effort: Pulmonary effort is normal.      Breath sounds: Normal breath sounds.   Musculoskeletal:      Cervical back: Normal range of motion and neck supple.      Right lower leg: No edema.      Left lower leg: No edema.   Lymphadenopathy:      Head:      Right side of head: No tonsillar, preauricular or posterior auricular adenopathy.      Left side of head: No tonsillar, preauricular or posterior auricular adenopathy.      Cervical: No cervical adenopathy.   Skin:     General: Skin is warm.      Capillary Refill: Capillary refill takes less than 2 seconds.   Neurological:      Mental Status: She is alert and oriented to person, place, and time.      Coordination: Coordination normal.      Gait: Gait normal.   Psychiatric:    "      Mood and Affect: Mood normal.         Behavior: Behavior normal.         Thought Content: Thought content normal.       Assessment / Plan      Assessment/Plan:   Diagnoses and all orders for this visit:    1. Benign hypertension (Primary)  2. Cerebrovascular accident (CVA) due to thrombosis of cerebral artery (HCC)  3. Hyperlipidemia, unspecified hyperlipidemia type        - Follow heart healthy diet.  Keep sodium intake < 1500 mg per day.  Avoid processed & fast foods.          - Exercise as tolerated, with a goal of 30 minutes of moderate exercise most days.         - Take medications as prescribed.    4. Earache on left        - Warm compress to ear as needed pain.  Acetaminophen per package directions if needed for pain.        - Patient will contact clinic if earache fails to improve with conservative measures.    5. BLE edema        - Wear compression hose when out of bed.        - Consider increasing HCTZ to 25 mg daily, blood pressure on the low side of normal.  We will continue to monitor edema and blood pressure.  We will find alternate treatment if necessary.     Follow Up:   Return in about 6 months (around 9/24/2023) for Medicare Wellness.    Patient was given instructions and counseling regarding her condition or for health maintenance advice. Please see specific information pulled into the AVS if appropriate.       [facility]     Please note that portions of this note may have been completed with a voice recognition program. Efforts were made to edit dictation, but occasionally words are mistranscribed.

## 2023-04-05 DIAGNOSIS — I10 BENIGN HYPERTENSION: ICD-10-CM

## 2023-04-05 RX ORDER — CARVEDILOL 25 MG/1
TABLET ORAL
Qty: 180 TABLET | Refills: 1 | Status: SHIPPED | OUTPATIENT
Start: 2023-04-05

## 2023-09-21 ENCOUNTER — TELEPHONE (OUTPATIENT)
Dept: INTERNAL MEDICINE | Facility: CLINIC | Age: 73
End: 2023-09-21
Payer: MEDICARE

## 2023-09-25 ENCOUNTER — OFFICE VISIT (OUTPATIENT)
Dept: INTERNAL MEDICINE | Facility: CLINIC | Age: 73
End: 2023-09-25

## 2023-09-25 VITALS
WEIGHT: 146 LBS | DIASTOLIC BLOOD PRESSURE: 60 MMHG | SYSTOLIC BLOOD PRESSURE: 122 MMHG | BODY MASS INDEX: 24.32 KG/M2 | OXYGEN SATURATION: 98 % | TEMPERATURE: 98.2 F | HEART RATE: 70 BPM | HEIGHT: 65 IN

## 2023-09-25 DIAGNOSIS — Z00.00 ENCOUNTER FOR SUBSEQUENT ANNUAL WELLNESS VISIT (AWV) IN MEDICARE PATIENT: Primary | ICD-10-CM

## 2023-09-25 DIAGNOSIS — Z78.0 POSTMENOPAUSAL: ICD-10-CM

## 2023-09-25 DIAGNOSIS — Z13.0 SCREENING FOR DISORDER OF BLOOD AND BLOOD-FORMING ORGANS: ICD-10-CM

## 2023-09-25 DIAGNOSIS — Z13.820 SCREENING FOR OSTEOPOROSIS: ICD-10-CM

## 2023-09-25 DIAGNOSIS — I10 BENIGN HYPERTENSION: ICD-10-CM

## 2023-09-25 DIAGNOSIS — I63.30 CEREBROVASCULAR ACCIDENT (CVA) DUE TO THROMBOSIS OF CEREBRAL ARTERY: ICD-10-CM

## 2023-09-25 DIAGNOSIS — R79.81 ELEVATED CARBON DIOXIDE LEVEL: ICD-10-CM

## 2023-09-25 DIAGNOSIS — E78.5 HYPERLIPIDEMIA, UNSPECIFIED HYPERLIPIDEMIA TYPE: ICD-10-CM

## 2023-09-25 NOTE — PROGRESS NOTES
The ABCs of the Annual Wellness Visit  Subsequent Medicare Wellness Visit    Subjective      Lisa Cruz is a 73 y.o. female who presents for a Subsequent Medicare Wellness Visit.    The following portions of the patient's history were reviewed and   updated as appropriate: allergies, current medications, past family history, past medical history, past social history, past surgical history, and problem list.    Compared to one year ago, the patient feels her physical   health is the same.    Compared to one year ago, the patient feels her mental   health is better.    Recent Hospitalizations:  She was not admitted to the hospital during the last year.       Current Medical Providers:  Patient Care Team:  Linda Lipscomb APRN as PCP - General (Family Medicine)    Outpatient Medications Prior to Visit   Medication Sig Dispense Refill    Calcium-Vitamin D-Vitamin K (Viactiv Calcium Plus D) 650-12.5-40 MG-MCG-MCG chewable tablet       carvedilol (COREG) 25 MG tablet TAKE 1 TABLET BY MOUTH TWICE DAILY WITH MEALS 180 tablet 1    cholecalciferol (VITAMIN D3) 1000 units tablet Take 1,000 Units by mouth 2 (Two) Times a Day.      clopidogrel (PLAVIX) 75 MG tablet TAKE 1 TABLET BY MOUTH DAILY 90 tablet 3    fexofenadine (ALLEGRA) 60 MG tablet Take 60 mg by mouth Daily.      hydroCHLOROthiazide (HYDRODIURIL) 12.5 MG tablet TAKE 1 TABLET BY MOUTH DAILY 90 tablet 1    irbesartan-hydrochlorothiazide (AVALIDE) 300-12.5 MG tablet TAKE 1 TABLET BY MOUTH DAILY 90 tablet 1    LUMIGAN 0.01 % ophthalmic drops   0    Magnesium 400 MG tablet       Melatonin 1 MG/4ML liquid melatonin   1 po QHS      pravastatin (PRAVACHOL) 40 MG tablet TAKE 1/2 TABLET BY MOUTH EVERY DAY 30 tablet 3    amLODIPine (NORVASC) 5 MG tablet TAKE 1 TABLET BY MOUTH DAILY 90 tablet 1     No facility-administered medications prior to visit.       No opioid medication identified on active medication list. I have reviewed chart for other potential  high risk  "medication/s and harmful drug interactions in the elderly.        Aspirin is not on active medication list.  Aspirin use is not indicated based on review of current medical condition/s. Risk of harm outweighs potential benefits.  .    Patient Active Problem List   Diagnosis    Anxiety    Cerebrovascular accident    Benign hypertension    Hyperlipidemia    Insomnia    Ocular hypertension    Hemorrhoids     Advance Care Planning   Advance Care Planning     Advance Directive is not on file.  ACP discussion was held with the patient during this visit. Patient has an advance directive (not in EMR), copy requested.     Objective    Vitals:    23 0827   BP: 122/60   Pulse: 70   Temp: 98.2 °F (36.8 °C)   SpO2: 98%   Weight: 66.2 kg (146 lb)   Height: 165.1 cm (65\")   PainSc: 0-No pain     Estimated body mass index is 24.3 kg/m² as calculated from the following:    Height as of this encounter: 165.1 cm (65\").    Weight as of this encounter: 66.2 kg (146 lb).    BMI is within normal parameters. No other follow-up for BMI required.      Does the patient have evidence of cognitive impairment?   No            HEALTH RISK ASSESSMENT    Smoking Status:  Social History     Tobacco Use   Smoking Status Never   Smokeless Tobacco Never     Alcohol Consumption:  Social History     Substance and Sexual Activity   Alcohol Use No    Comment: occ     Fall Risk Screen:    STEADI Fall Risk Assessment was completed, and patient is at LOW risk for falls.Assessment completed on:2023    Depression Screenin/25/2023     8:31 AM   PHQ-2/PHQ-9 Depression Screening   Little Interest or Pleasure in Doing Things 0-->not at all   Feeling Down, Depressed or Hopeless 0-->not at all   PHQ-9: Brief Depression Severity Measure Score 0       Health Habits and Functional and Cognitive Screenin/25/2023     8:32 AM   Functional & Cognitive Status   Do you have difficulty preparing food and eating? No   Do you have difficulty bathing " yourself, getting dressed or grooming yourself? No   Do you have difficulty using the toilet? No   Do you have difficulty moving around from place to place? No   Do you have trouble with steps or getting out of a bed or a chair? No   Current Diet Well Balanced Diet   Dental Exam Up to date   Eye Exam Up to date   Exercise (times per week) 0 times per week   Current Exercises Include Yard Work;No Regular Exercise;Gardening   Do you need help using the phone?  No   Are you deaf or do you have serious difficulty hearing?  No   Do you need help to go to places out of walking distance? No   Do you need help shopping? No   Do you need help preparing meals?  No   Do you need help with housework?  No   Do you need help with laundry? No   Do you need help taking your medications? No   Do you need help managing money? No   Do you ever drive or ride in a car without wearing a seat belt? No   Have you felt unusual stress, anger or loneliness in the last month? No   Who do you live with? Spouse   If you need help, do you have trouble finding someone available to you? No   Have you been bothered in the last four weeks by sexual problems? No   Do you have difficulty concentrating, remembering or making decisions? No       Age-appropriate Screening Schedule:  Refer to the list below for future screening recommendations based on patient's age, sex and/or medical conditions. Orders for these recommended tests are listed in the plan section. The patient has been provided with a written plan.    Health Maintenance   Topic Date Due    LIPID PANEL  06/22/2023    COVID-19 Vaccine (5 - Moderna series) 12/15/2024 (Originally 4/13/2023)    INFLUENZA VACCINE  10/01/2023    DXA SCAN  12/21/2023    ANNUAL WELLNESS VISIT  09/25/2024    MAMMOGRAM  02/28/2025    TDAP/TD VACCINES (4 - Td or Tdap) 06/20/2027    COLORECTAL CANCER SCREENING  02/02/2031    HEPATITIS C SCREENING  Completed    Pneumococcal Vaccine 65+  Completed    ZOSTER VACCINE   Completed                  CMS Preventative Services Quick Reference  Risk Factors Identified During Encounter:    Immunizations Discussed/Encouraged: Influenza and COVID19  Polypharmacy: Medication List reviewed    The above risks/problems have been discussed with the patient.  Pertinent information has been shared with the patient in the After Visit Summary.    Diagnoses and all orders for this visit:    1. Encounter for subsequent annual wellness visit (AWV) in Medicare patient (Primary)    2. Benign hypertension  -     Comprehensive Metabolic Panel  - Stopped taking amlodipine, to rule out potential causes of lower extremity edema.  Edema resolved when amlodipine was discontinued, blood pressure has been normal when checked at home since discontinuation.          - Follow heart healthy diet.  Keep sodium intake < 1500 mg per day.  Avoid processed & fast foods.          - Exercise as tolerated, with a goal of 30 minutes of moderate exercise most days.         - Take medications as prescribed.    3. Hyperlipidemia, unspecified hyperlipidemia type  -     Lipid Panel        - Follow heart healthy diet.  Keep sodium intake < 1500 mg per day.  Avoid processed & fast foods.          - Exercise as tolerated, with a goal of 30 minutes of moderate exercise most days.         - Take medications as prescribed.    4. Cerebrovascular accident (CVA) due to thrombosis of cerebral artery        - Keeps blood pressure and lipids well managed.  Eat a heart healthy diet.  Physically active most days of the week.    5. Elevated carbon dioxide level        - CO2 noted to be slightly elevated on 2 separate labs in the past 18 months.  She does not snore, feels well rested when she wakes up most of the time.  Denies daytime drowsiness.  Has noticed occasional prolonged expiration.  -     Complete PFT - Pre & Post Bronchodilator; Future    6. Screening for disorder of blood and blood-forming organs  -     CBC & Differential    7.  Postmenopausal  -     dexa bone density axial; Future    8. Screening for osteoporosis  -     dexa bone density axial; Future        Follow Up:   Next Medicare Wellness visit to be scheduled in 1 year.      An After Visit Summary and PPPS were made available to the patient.

## 2023-10-04 DIAGNOSIS — I10 BENIGN HYPERTENSION: ICD-10-CM

## 2023-10-04 RX ORDER — CARVEDILOL 25 MG/1
TABLET ORAL
Qty: 180 TABLET | Refills: 1 | Status: SHIPPED | OUTPATIENT
Start: 2023-10-04

## 2023-10-09 ENCOUNTER — HOSPITAL ENCOUNTER (OUTPATIENT)
Dept: PULMONOLOGY | Facility: HOSPITAL | Age: 73
Discharge: HOME | End: 2023-10-09
Admitting: NURSE PRACTITIONER
Payer: MEDICARE

## 2023-10-09 DIAGNOSIS — R79.81 ELEVATED CARBON DIOXIDE LEVEL: ICD-10-CM

## 2023-10-09 PROCEDURE — 94729 DIFFUSING CAPACITY: CPT

## 2023-10-09 PROCEDURE — 94060 EVALUATION OF WHEEZING: CPT

## 2023-10-09 PROCEDURE — 94640 AIRWAY INHALATION TREATMENT: CPT

## 2023-10-09 PROCEDURE — 94726 PLETHYSMOGRAPHY LUNG VOLUMES: CPT

## 2023-10-09 PROCEDURE — 63710000001 ALBUTEROL SULFATE HFA 108 (90 BASE) MCG/ACT AEROSOL SOLUTION 8.5 G INHALER: Performed by: NURSE PRACTITIONER

## 2023-10-09 PROCEDURE — A9270 NON-COVERED ITEM OR SERVICE: HCPCS | Performed by: NURSE PRACTITIONER

## 2023-10-09 RX ORDER — ALBUTEROL SULFATE 90 UG/1
4 AEROSOL, METERED RESPIRATORY (INHALATION) ONCE
Status: COMPLETED | OUTPATIENT
Start: 2023-10-09 | End: 2023-10-09

## 2023-10-09 RX ADMIN — ALBUTEROL SULFATE 4 PUFF: 90 AEROSOL, METERED RESPIRATORY (INHALATION) at 10:14

## 2023-12-12 DIAGNOSIS — I63.30 CEREBROVASCULAR ACCIDENT (CVA) DUE TO THROMBOSIS OF CEREBRAL ARTERY: ICD-10-CM

## 2023-12-13 RX ORDER — CLOPIDOGREL BISULFATE 75 MG/1
75 TABLET ORAL DAILY
Qty: 90 TABLET | Refills: 3 | Status: SHIPPED | OUTPATIENT
Start: 2023-12-13

## 2023-12-22 ENCOUNTER — APPOINTMENT (OUTPATIENT)
Dept: BONE DENSITY | Facility: HOSPITAL | Age: 73
End: 2023-12-22
Payer: MEDICARE

## 2023-12-22 DIAGNOSIS — Z13.820 SCREENING FOR OSTEOPOROSIS: ICD-10-CM

## 2023-12-22 DIAGNOSIS — Z78.0 POSTMENOPAUSAL: ICD-10-CM

## 2023-12-22 PROCEDURE — 77080 DXA BONE DENSITY AXIAL: CPT

## 2024-01-03 ENCOUNTER — OFFICE VISIT (OUTPATIENT)
Dept: PULMONOLOGY | Facility: CLINIC | Age: 74
End: 2024-01-03
Payer: MEDICARE

## 2024-01-03 VITALS
SYSTOLIC BLOOD PRESSURE: 116 MMHG | OXYGEN SATURATION: 98 % | HEIGHT: 65 IN | HEART RATE: 68 BPM | WEIGHT: 146 LBS | BODY MASS INDEX: 24.32 KG/M2 | DIASTOLIC BLOOD PRESSURE: 68 MMHG | RESPIRATION RATE: 18 BRPM

## 2024-01-03 DIAGNOSIS — R06.89 HYPERCARBIA: ICD-10-CM

## 2024-01-03 DIAGNOSIS — R91.8 ABNORMAL X-RAY OF LUNG: ICD-10-CM

## 2024-01-03 DIAGNOSIS — R94.2 ABNORMAL PFTS: ICD-10-CM

## 2024-01-03 DIAGNOSIS — Z77.22 SECOND HAND SMOKE EXPOSURE: ICD-10-CM

## 2024-01-03 DIAGNOSIS — R94.2 ABNORMAL PFTS: Primary | ICD-10-CM

## 2024-01-03 NOTE — PROGRESS NOTES
"  CONSULT NOTE    Requested by:   Linda Lipscomb, *   Linda Lipscomb APRN      Chief Complaint   Patient presents with    Breathing Problem    Consult       Subjective:  Lisa Cruz is a 73 y.o. female.   Patient comes in today for consultation because of abnormal PFTs.    The patient says that she is a non-smoker but her whole family smoked around her when she was younger.    She denies any significant shortness of breath although does have some sinus issues and some seasonal cough.    When asked about why PFTs were performed, she says that her CO2 level was elevated on the blood work.    She does report exposure to Wood and coal fired heating, as a child.       The following portions of the patient's history were reviewed and updated as appropriate: allergies, current medications, past family history, past medical history, past social history, and past surgical history.    Review of Systems   HENT:  Positive for sinus pressure. Negative for sneezing and sore throat.    Respiratory:  Positive for cough. Negative for chest tightness, shortness of breath and wheezing.    Cardiovascular:  Negative for palpitations and leg swelling.   Psychiatric/Behavioral:  Negative for sleep disturbance.    All other systems reviewed and are negative.      Past Medical History:   Diagnosis Date    Allergic 1968    Anxiety 2002    Cancer 1996    Skin, eyelid    Colon polyp     GERD (gastroesophageal reflux disease)     Glaucoma 1981    History of blood transfusion     Hyperlipidemia     Hypertension     Osteopenia 1999    Osteoporosis     Ovarian cyst     Stroke        Social History     Tobacco Use    Smoking status: Never    Smokeless tobacco: Never   Substance Use Topics    Alcohol use: No     Comment: occ         Objective:  Visit Vitals  /68   Pulse 68   Resp 18   Ht 165.1 cm (65\") Comment: pt reported   Wt 66.2 kg (146 lb)   SpO2 98%   BMI 24.30 kg/m²       Physical Exam  Vitals reviewed. "   Constitutional:       Appearance: She is well-developed.   HENT:      Head:      Comments: No acute lesions noted     Mouth/Throat:      Mouth: Mucous membranes are moist.   Neck:      Thyroid: No thyromegaly.      Vascular: No JVD.   Cardiovascular:      Rate and Rhythm: Normal rate and regular rhythm.      Heart sounds: No murmur heard.  Pulmonary:      Effort: Pulmonary effort is normal. No respiratory distress.      Breath sounds: No wheezing or rales.   Musculoskeletal:      Cervical back: Neck supple.      Comments: Gait was normal.   Skin:     General: Skin is warm and dry.   Neurological:      Mental Status: She is alert and oriented to person, place, and time.   Psychiatric:         Behavior: Behavior normal.           Assessment/Plan:  Diagnoses and all orders for this visit:    1. Abnormal PFTs (Primary)  -     Complete PFT - Pre & Post Bronchodilator; Future    2. Hypercarbia  -     Complete PFT - Pre & Post Bronchodilator; Future    3. Abnormal x-ray of lung  -     Complete PFT - Pre & Post Bronchodilator; Future    4. Second hand smoke exposure        Return if symptoms worsen or fail to improve.    DISCUSSION(if any):  Last chest x-ray was reviewed personally and the results were shared with the patient.  Images reviewed personally.   Results for orders placed during the hospital encounter of 10/03/22    XR Chest PA & Lateral    Narrative  PROCEDURE: XR CHEST PA AND LATERAL-    HISTORY: k64.1; K64.1-Second degree hemorrhoids, preoperative for  respiratory clearance    COMPARISON: 08/06/2014.    FINDINGS:  Bilateral upper lobe scarring likely related to old  granulomatous disease. No acute pulmonary density is seen.    There is no evidence of effusion or other pleural disease.  The  mediastinum has a normal appearance.    The cardiac silhouette is unremarkable.    Impression  No acute findings    This report was signed and finalized on 10/3/2022 4:07 PM by Remberto Seo MD.      I have also reviewed  her primary care provider's last note that mentions elevated CO2 on 2 separate labs.     ===========================  ===========================    Last PFTs in October were reviewed.  Volumes were somewhat poorly performed.    Today's PFTs were reviewed.  Mild obstruction without restriction.    Laboratory workup also showed   Lab Results   Component Value Date    HGB 12.4 10/26/2022    HGB 12.1 06/22/2022    HGB 13.4 12/21/2021   ,   Lab Results   Component Value Date    HCT 36.2 10/26/2022    HCT 35.3 06/22/2022    HCT 40.2 12/21/2021       Lab Results   Component Value Date    EOSABS 0.14 10/26/2022    EOSABS 0.24 06/22/2022    EOSABS 0.17 12/21/2021    & Laboratory workup also showed   Lab Results   Component Value Date    CO2 30.2 (H) 10/26/2022   ,   Lab Results   Component Value Date    TSH 1.950 06/22/2022     ===========================  ===========================    PFTs repeated today and volumes were normal. She does appear to have mild obstruction.    Patient does not have any significant shortness of breath at baseline and some of her issues could be from mild intermittent seasonal asthma.    Patient was advised to use rescue inhaler for when necessary purposes    Patient was also advised to keep a log of the use of rescue inhaler.      If she has any further worsening in her symptoms, I would suggest considering other etiologies for hypercarbia.      Dictated utilizing Dragon dictation.    This document was electronically signed by Jimy Santiago MD on 01/03/24 at 10:42 EST

## 2024-01-07 DIAGNOSIS — I10 BENIGN HYPERTENSION: ICD-10-CM

## 2024-01-08 ENCOUNTER — PATIENT ROUNDING (BHMG ONLY) (OUTPATIENT)
Dept: PULMONOLOGY | Facility: CLINIC | Age: 74
End: 2024-01-08
Payer: MEDICARE

## 2024-01-08 RX ORDER — IRBESARTAN AND HYDROCHLOROTHIAZIDE 300; 12.5 MG/1; MG/1
1 TABLET, FILM COATED ORAL DAILY
Qty: 90 TABLET | Refills: 1 | Status: SHIPPED | OUTPATIENT
Start: 2024-01-08

## 2024-01-08 RX ORDER — HYDROCHLOROTHIAZIDE 12.5 MG/1
12.5 TABLET ORAL DAILY
Qty: 90 TABLET | Refills: 1 | Status: SHIPPED | OUTPATIENT
Start: 2024-01-08

## 2024-01-16 RX ORDER — ALBUTEROL SULFATE 90 UG/1
2 AEROSOL, METERED RESPIRATORY (INHALATION) EVERY 4 HOURS PRN
Qty: 18 G | Refills: 3 | Status: SHIPPED | OUTPATIENT
Start: 2024-01-16

## 2024-01-25 ENCOUNTER — TRANSCRIBE ORDERS (OUTPATIENT)
Dept: ADMINISTRATIVE | Facility: HOSPITAL | Age: 74
End: 2024-01-25
Payer: MEDICARE

## 2024-01-25 DIAGNOSIS — Z12.31 VISIT FOR SCREENING MAMMOGRAM: Primary | ICD-10-CM

## 2024-03-05 RX ORDER — PRAVASTATIN SODIUM 40 MG
20 TABLET ORAL DAILY
Qty: 90 TABLET | Refills: 1 | Status: SHIPPED | OUTPATIENT
Start: 2024-03-05

## 2024-03-05 NOTE — TELEPHONE ENCOUNTER
Caller: Lisa Cruz All    Relationship: Self    Best call back number: [phone]     Requested Prescriptions:   Requested Prescriptions     Pending Prescriptions Disp Refills    pravastatin (PRAVACHOL) 40 MG tablet 30 tablet 3     Sig: Take 0.5 tablets by mouth Daily.        [id number] [place] [address] AT [facility] - [phone]  - [phone] FX     Last office visit with prescribing clinician: 9/25/2023   Last telemedicine visit with prescribing clinician: Visit date not found   Next office visit with prescribing clinician: 3/25/2024     Additional details provided by patient: PATIENT REQUESTS A 90 DAY SUPPLY WHICH WOULD BE 45 PILLS    Does the patient have less than a 3 day supply:  [x] Yes  [] No      Ann Marie Pozo Rep   03/05/24 10:06 EST

## 2024-03-25 ENCOUNTER — OFFICE VISIT (OUTPATIENT)
Dept: INTERNAL MEDICINE | Facility: CLINIC | Age: 74
End: 2024-03-25
Payer: MEDICARE

## 2024-03-25 VITALS
SYSTOLIC BLOOD PRESSURE: 132 MMHG | WEIGHT: 143 LBS | DIASTOLIC BLOOD PRESSURE: 72 MMHG | HEART RATE: 66 BPM | TEMPERATURE: 98.1 F | BODY MASS INDEX: 23.82 KG/M2 | HEIGHT: 65 IN | OXYGEN SATURATION: 96 %

## 2024-03-25 DIAGNOSIS — I63.30 CEREBROVASCULAR ACCIDENT (CVA) DUE TO THROMBOSIS OF CEREBRAL ARTERY: ICD-10-CM

## 2024-03-25 DIAGNOSIS — I10 BENIGN HYPERTENSION: Primary | ICD-10-CM

## 2024-03-25 DIAGNOSIS — E78.5 HYPERLIPIDEMIA, UNSPECIFIED HYPERLIPIDEMIA TYPE: ICD-10-CM

## 2024-03-25 PROCEDURE — 3075F SYST BP GE 130 - 139MM HG: CPT | Performed by: NURSE PRACTITIONER

## 2024-03-25 PROCEDURE — 1159F MED LIST DOCD IN RCRD: CPT | Performed by: NURSE PRACTITIONER

## 2024-03-25 PROCEDURE — 99213 OFFICE O/P EST LOW 20 MIN: CPT | Performed by: NURSE PRACTITIONER

## 2024-03-25 PROCEDURE — 1160F RVW MEDS BY RX/DR IN RCRD: CPT | Performed by: NURSE PRACTITIONER

## 2024-03-25 PROCEDURE — 3078F DIAST BP <80 MM HG: CPT | Performed by: NURSE PRACTITIONER

## 2024-03-25 PROCEDURE — G2211 COMPLEX E/M VISIT ADD ON: HCPCS | Performed by: NURSE PRACTITIONER

## 2024-03-25 NOTE — PROGRESS NOTES
Office Visit      Patient Name: Lisa Cruz  : 1950   MRN: [id number]     Chief Complaint:    Chief Complaint   Patient presents with    Hypertension       History of Present Illness: Lisa Cruz is a 73 y.o. female who is here today for follow up of HTN.    HTN, hyperlipids, h/o CVA.  Taking medication as prescribed.  Does not monitor BP at home.  Active most days.  Tries to eat a healthy diet.  Denies chest pain, dyspnea, orthopnea, palpitations, lower extremity edema, confusion, headaches, weakness, visual disturbances.     COVID in .  Prescribed antibiotic and steroid, as well.      Subjective      I have reviewed and the following portions of the patient's history were updated as appropriate: past family history, past medical history, past social history, past surgical history and problem list.      Current Outpatient Medications:     albuterol sulfate  (90 Base) MCG/ACT inhaler, Inhale 2 puffs Every 4 (Four) Hours As Needed for Wheezing., Disp: 18 g, Rfl: 3    Calcium-Vitamin D-Vitamin K (Viactiv Calcium Plus D) 650-12.5-40 MG-MCG-MCG chewable tablet, , Disp: , Rfl:     carvedilol (COREG) 25 MG tablet, TAKE 1 TABLET BY MOUTH TWICE DAILY WITH MEALS, Disp: 180 tablet, Rfl: 3    cholecalciferol (VITAMIN D3) 1000 units tablet, Take 1 tablet by mouth 2 (Two) Times a Day., Disp: , Rfl:     clopidogrel (PLAVIX) 75 MG tablet, TAKE 1 TABLET BY MOUTH DAILY, Disp: 90 tablet, Rfl: 3    fexofenadine (ALLEGRA) 60 MG tablet, Take 1 tablet by mouth Daily., Disp: , Rfl:     hydroCHLOROthiazide (HYDRODIURIL) 12.5 MG tablet, TAKE 1 TABLET BY MOUTH DAILY, Disp: 90 tablet, Rfl: 1    irbesartan-hydrochlorothiazide (AVALIDE) 300-12.5 MG tablet, TAKE 1 TABLET BY MOUTH DAILY, Disp: 90 tablet, Rfl: 1    LUMIGAN 0.01 % ophthalmic drops, , Disp: , Rfl: 0    Magnesium 400 MG tablet, , Disp: , Rfl:     Melatonin 1 MG/4ML liquid, , Disp: , Rfl:     pravastatin (PRAVACHOL) 40 MG tablet, Take 0.5  "tablets by mouth Daily., Disp: 90 tablet, Rfl: 1    Allergies   Allergen Reactions    Nickel Rash       Objective     Physical Exam:  Vital Signs:   Vitals:    03/25/24 0916   BP: 132/72   Pulse: 66   Temp: 98.1 °F (36.7 °C)   SpO2: 96%   Weight: 64.9 kg (143 lb)   Height: 165.1 cm (65\")     Body mass index is 23.8 kg/m².  BMI is within normal parameters. No other follow-up for BMI required.       Physical Exam  Constitutional:       Appearance: She is not ill-appearing.   HENT:      Head: Normocephalic.      Right Ear: External ear normal.      Left Ear: External ear normal.   Eyes:      Conjunctiva/sclera: Conjunctivae normal.      Pupils: Pupils are equal, round, and reactive to light.   Cardiovascular:      Rate and Rhythm: Normal rate and regular rhythm.      Pulses:           Radial pulses are 2+ on the right side and 2+ on the left side.        Dorsalis pedis pulses are 2+ on the right side and 2+ on the left side.      Heart sounds: Normal heart sounds.   Pulmonary:      Effort: Pulmonary effort is normal.      Breath sounds: Normal breath sounds.   Musculoskeletal:      Cervical back: Normal range of motion and neck supple.      Right lower leg: No edema.      Left lower leg: No edema.   Skin:     General: Skin is warm.      Capillary Refill: Capillary refill takes less than 2 seconds.   Neurological:      Mental Status: She is alert and oriented to person, place, and time.      Coordination: Coordination normal.      Gait: Gait normal.   Psychiatric:         Mood and Affect: Mood normal.         Behavior: Behavior normal.         Thought Content: Thought content normal.             Assessment / Plan      Assessment/Plan:   Diagnoses and all orders for this visit:    1. Benign hypertension (Primary)  2. Hyperlipidemia, unspecified hyperlipidemia type  3. Cerebrovascular accident (CVA) due to thrombosis of cerebral artery        - Follow heart healthy diet.  Keep sodium intake < 1500 mg per day.  Avoid " processed & fast foods.          - Exercise as tolerated, with a goal of 30 minutes of moderate exercise most days.         - Take medications as prescribed.         Follow Up:   Return in about 6 months (around 9/25/2024) for Medicare Wellness.    Patient was given instructions and counseling regarding her condition or for health maintenance advice. Please see specific information pulled into the AVS if appropriate.       [facility]     Please note that portions of this note may have been completed with a voice recognition program. Efforts were made to edit dictation, but occasionally words are mistranscribed.

## 2024-03-29 ENCOUNTER — HOSPITAL ENCOUNTER (OUTPATIENT)
Dept: MAMMOGRAPHY | Facility: HOSPITAL | Age: 74
Discharge: HOME | End: 2024-03-29
Admitting: NURSE PRACTITIONER
Payer: MEDICARE

## 2024-03-29 DIAGNOSIS — Z12.31 VISIT FOR SCREENING MAMMOGRAM: ICD-10-CM

## 2024-03-29 LAB
ALBUMIN SERPL-MCNC: 4.1 G/DL (ref 3.5–5.2)
ALBUMIN/GLOB SERPL: 1.7 G/DL
ALP SERPL-CCNC: 91 U/L (ref 39–117)
ALT SERPL-CCNC: 25 U/L (ref 1–33)
AST SERPL-CCNC: 20 U/L (ref 1–32)
BASOPHILS # BLD AUTO: 0.04 10*3/MM3 (ref 0–0.2)
BASOPHILS NFR BLD AUTO: 0.9 % (ref 0–1.5)
BILIRUB SERPL-MCNC: 0.5 MG/DL (ref 0–1.2)
BUN SERPL-MCNC: 13 MG/DL (ref 8–23)
BUN/CREAT SERPL: 13.8 (ref 7–25)
CALCIUM SERPL-MCNC: 9.5 MG/DL (ref 8.6–10.5)
CHLORIDE SERPL-SCNC: 103 MMOL/L (ref 98–107)
CHOLEST SERPL-MCNC: 159 MG/DL (ref 0–200)
CO2 SERPL-SCNC: 28 MMOL/L (ref 22–29)
CREAT SERPL-MCNC: 0.94 MG/DL (ref 0.57–1)
EGFRCR SERPLBLD CKD-EPI 2021: 63.8 ML/MIN/1.73
EOSINOPHIL # BLD AUTO: 0.27 10*3/MM3 (ref 0–0.4)
EOSINOPHIL NFR BLD AUTO: 5.8 % (ref 0.3–6.2)
ERYTHROCYTE [DISTWIDTH] IN BLOOD BY AUTOMATED COUNT: 13 % (ref 12.3–15.4)
GLOBULIN SER CALC-MCNC: 2.4 GM/DL
GLUCOSE SERPL-MCNC: 93 MG/DL (ref 65–99)
HCT VFR BLD AUTO: 36.5 % (ref 34–46.6)
HDLC SERPL-MCNC: 52 MG/DL (ref 40–60)
HGB BLD-MCNC: 12.3 G/DL (ref 12–15.9)
IMM GRANULOCYTES # BLD AUTO: 0.03 10*3/MM3 (ref 0–0.05)
IMM GRANULOCYTES NFR BLD AUTO: 0.6 % (ref 0–0.5)
LDLC SERPL CALC-MCNC: 88 MG/DL (ref 0–100)
LYMPHOCYTES # BLD AUTO: 1.27 10*3/MM3 (ref 0.7–3.1)
LYMPHOCYTES NFR BLD AUTO: 27.5 % (ref 19.6–45.3)
MCH RBC QN AUTO: 31 PG (ref 26.6–33)
MCHC RBC AUTO-ENTMCNC: 33.7 G/DL (ref 31.5–35.7)
MCV RBC AUTO: 91.9 FL (ref 79–97)
MONOCYTES # BLD AUTO: 0.39 10*3/MM3 (ref 0.1–0.9)
MONOCYTES NFR BLD AUTO: 8.4 % (ref 5–12)
NEUTROPHILS # BLD AUTO: 2.62 10*3/MM3 (ref 1.7–7)
NEUTROPHILS NFR BLD AUTO: 56.8 % (ref 42.7–76)
NRBC BLD AUTO-RTO: 0 /100 WBC (ref 0–0.2)
PLATELET # BLD AUTO: 254 10*3/MM3 (ref 140–450)
POTASSIUM SERPL-SCNC: 3.9 MMOL/L (ref 3.5–5.2)
PROT SERPL-MCNC: 6.5 G/DL (ref 6–8.5)
RBC # BLD AUTO: 3.97 10*6/MM3 (ref 3.77–5.28)
SODIUM SERPL-SCNC: 139 MMOL/L (ref 136–145)
TRIGL SERPL-MCNC: 103 MG/DL (ref 0–150)
VLDLC SERPL CALC-MCNC: 19 MG/DL (ref 5–40)
WBC # BLD AUTO: 4.62 10*3/MM3 (ref 3.4–10.8)

## 2024-03-29 PROCEDURE — 77063 BREAST TOMOSYNTHESIS BI: CPT

## 2024-03-29 PROCEDURE — 77067 SCR MAMMO BI INCL CAD: CPT

## 2024-04-01 ENCOUNTER — PATIENT MESSAGE (OUTPATIENT)
Dept: INTERNAL MEDICINE | Facility: CLINIC | Age: 74
End: 2024-04-01
Payer: MEDICARE

## 2024-04-08 DIAGNOSIS — I10 BENIGN HYPERTENSION: ICD-10-CM

## 2024-04-08 RX ORDER — CARVEDILOL 25 MG/1
TABLET ORAL
Qty: 180 TABLET | Refills: 3 | Status: SHIPPED | OUTPATIENT
Start: 2024-04-08

## 2024-06-25 ENCOUNTER — OFFICE VISIT (OUTPATIENT)
Dept: INTERNAL MEDICINE | Facility: CLINIC | Age: 74
End: 2024-06-25
Payer: MEDICARE

## 2024-06-25 ENCOUNTER — HOSPITAL ENCOUNTER (OUTPATIENT)
Dept: GENERAL RADIOLOGY | Facility: HOSPITAL | Age: 74
Discharge: HOME | End: 2024-06-25
Admitting: PHYSICIAN ASSISTANT
Payer: MEDICARE

## 2024-06-25 VITALS
OXYGEN SATURATION: 99 % | RESPIRATION RATE: 20 BRPM | DIASTOLIC BLOOD PRESSURE: 76 MMHG | TEMPERATURE: 97 F | WEIGHT: 143 LBS | BODY MASS INDEX: 23.82 KG/M2 | SYSTOLIC BLOOD PRESSURE: 134 MMHG | HEART RATE: 51 BPM | HEIGHT: 65 IN

## 2024-06-25 DIAGNOSIS — R07.81 RIB PAIN ON LEFT SIDE: Primary | ICD-10-CM

## 2024-06-25 PROCEDURE — 1159F MED LIST DOCD IN RCRD: CPT | Performed by: PHYSICIAN ASSISTANT

## 2024-06-25 PROCEDURE — 99213 OFFICE O/P EST LOW 20 MIN: CPT | Performed by: PHYSICIAN ASSISTANT

## 2024-06-25 PROCEDURE — 3075F SYST BP GE 130 - 139MM HG: CPT | Performed by: PHYSICIAN ASSISTANT

## 2024-06-25 PROCEDURE — 1160F RVW MEDS BY RX/DR IN RCRD: CPT | Performed by: PHYSICIAN ASSISTANT

## 2024-06-25 PROCEDURE — 1125F AMNT PAIN NOTED PAIN PRSNT: CPT | Performed by: PHYSICIAN ASSISTANT

## 2024-06-25 PROCEDURE — 71046 X-RAY EXAM CHEST 2 VIEWS: CPT

## 2024-06-25 PROCEDURE — 3078F DIAST BP <80 MM HG: CPT | Performed by: PHYSICIAN ASSISTANT

## 2024-06-25 NOTE — PROGRESS NOTES
Office Visit      Patient Name: Lisa Cruz  : 1950   MRN: [id number]     Chief Complaint:    Chief Complaint   Patient presents with    Follow-up     Was seen at urgent care        History of Present Illness: Lisa Cruz is a 74 y.o. female who is here today to discuss recent falls.    She has had 2 falls within one week. She fell out of bed due to having a leg cramp causing her to fall. She was followed at [facility] urgent care on 2024 where she was found to have right fifth metatarsal fracture and possible tiny left lateral pneumothorax adjacent to the fourth and fifth ribs but with no definite rib fracture.    Some tenderness to right 5th metacarpal. Bruising resolved.     Has some pain with twisting and other certain movements. No SOA. Coughing and deep breaths causes some pain. Improving since onset.         Subjective      I have reviewed and the following portions of the patient's history were updated as appropriate: past family history, past medical history, past social history, past surgical history and problem list.      Current Outpatient Medications:     albuterol sulfate  (90 Base) MCG/ACT inhaler, Inhale 2 puffs Every 4 (Four) Hours As Needed for Wheezing., Disp: 18 g, Rfl: 3    Calcium-Vitamin D-Vitamin K (Viactiv Calcium Plus D) 650-12.5-40 MG-MCG-MCG chewable tablet, , Disp: , Rfl:     carvedilol (COREG) 25 MG tablet, TAKE 1 TABLET BY MOUTH TWICE DAILY WITH MEALS, Disp: 180 tablet, Rfl: 3    cholecalciferol (VITAMIN D3) 1000 units tablet, Take 1 tablet by mouth 2 (Two) Times a Day., Disp: , Rfl:     clopidogrel (PLAVIX) 75 MG tablet, TAKE 1 TABLET BY MOUTH DAILY, Disp: 90 tablet, Rfl: 3    Diclofenac Sodium (VOLTAREN) 1 % gel gel, Apply 4 g topically to the appropriate area as directed 4 (Four) Times a Day As Needed (for muscle and joint pain)., Disp: 100 g, Rfl: 0    fluoruracil (CARAC) 0.5 % cream, Apply 1 Application topically to the appropriate area as directed  "Daily. 5 days, Disp: , Rfl:     hydroCHLOROthiazide (HYDRODIURIL) 12.5 MG tablet, TAKE 1 TABLET BY MOUTH DAILY, Disp: 90 tablet, Rfl: 1    irbesartan-hydrochlorothiazide (AVALIDE) 300-12.5 MG tablet, TAKE 1 TABLET BY MOUTH DAILY, Disp: 90 tablet, Rfl: 1    LUMIGAN 0.01 % ophthalmic drops, , Disp: , Rfl: 0    Magnesium 400 MG tablet, , Disp: , Rfl:     Melatonin 1 MG/4ML liquid, , Disp: , Rfl:     pravastatin (PRAVACHOL) 40 MG tablet, Take 0.5 tablets by mouth Daily., Disp: 90 tablet, Rfl: 1    Allergies   Allergen Reactions    Nickel Rash       Objective     Vital Signs:   Vitals:    06/25/24 1048   BP: 134/76   Pulse: 51   Resp: 20   Temp: 97 °F (36.1 °C)   SpO2: 99%   Weight: 64.9 kg (143 lb)   Height: 165.1 cm (65\")   PainSc:   3     Body mass index is 23.8 kg/m².    Physical Exam  Vitals and nursing note reviewed.   Constitutional:       General: She is not in acute distress.     Appearance: She is well-developed. She is not ill-appearing, toxic-appearing or diaphoretic.   HENT:      Head: Normocephalic and atraumatic.      Right Ear: External ear normal.      Left Ear: External ear normal.   Eyes:      General: No scleral icterus.     Extraocular Movements: Extraocular movements intact.      Conjunctiva/sclera: Conjunctivae normal.      Pupils: Pupils are equal, round, and reactive to light.   Cardiovascular:      Rate and Rhythm: Normal rate and regular rhythm.      Heart sounds: Normal heart sounds. No murmur heard.     No friction rub. No gallop.   Pulmonary:      Effort: Pulmonary effort is normal. No respiratory distress.      Breath sounds: No decreased air movement. Examination of the left-lower field reveals rales. Rales (faint) present. No decreased breath sounds, wheezing or rhonchi.   Chest:      Chest wall: Tenderness (left lateral ribs) present.   Abdominal:      General: Bowel sounds are normal.      Palpations: Abdomen is soft.      Tenderness: There is no abdominal tenderness.   Musculoskeletal: "         General: Tenderness present. No deformity. Normal range of motion.      Cervical back: Normal range of motion and neck supple.      Right lower leg: No edema.      Left lower leg: No edema.      Right foot: Normal range of motion and normal capillary refill. Bony tenderness (right 5th metatarsal head) present. No swelling or deformity. Normal pulse.        Legs:    Skin:     General: Skin is warm and dry.      Capillary Refill: Capillary refill takes less than 2 seconds.      Coloration: Skin is not jaundiced or pale.      Findings: Bruising (tiny bruise over left lateral ribs) present. No erythema or rash.   Neurological:      General: No focal deficit present.      Mental Status: She is alert and oriented to person, place, and time.      Cranial Nerves: No cranial nerve deficit.      Sensory: No sensory deficit.      Motor: No abnormal muscle tone.      Coordination: Coordination normal.      Gait: Gait normal.      Deep Tendon Reflexes: Reflexes normal.   Psychiatric:         Mood and Affect: Mood normal.         Behavior: Behavior normal.         Thought Content: Thought content normal.         Judgment: Judgment normal.         Common labs          3/29/2024    08:39   Common Labs   Glucose 93    BUN 13    Creatinine 0.94    Sodium 139    Potassium 3.9    Chloride 103    Calcium 9.5    Total Protein 6.5    Albumin 4.1    Total Bilirubin 0.5    Alkaline Phosphatase 91    AST (SGOT) 20    ALT (SGPT) 25    WBC 4.62    Hemoglobin 12.3    Hematocrit 36.5    Platelets 254    Total Cholesterol 159    Triglycerides 103    HDL Cholesterol 52    LDL Cholesterol  88          Assessment / Plan      Assessment/Plan:   Diagnoses and all orders for this visit:    1. Rib pain on left side (Primary)  -     XR Chest PA & Lateral       Small area of faint rales left lower lobe, mid-axillary line. Reassess with chest x-ray. Encouraged deep breathing exercises as tolerated. May continue NSAIDs prn for pain control.      Urgent Care record reviewed. Imaging results reviewed.         Follow Up:   No follow-ups on file.    Patient was given instructions and counseling regarding her condition or for health maintenance advice. Please see specific information pulled into the AVS if appropriate.     Wendy Carmona PA-C  [facility]     Please note that portions of this note may have been completed with a voice recognition program.

## 2024-07-08 RX ORDER — IRBESARTAN AND HYDROCHLOROTHIAZIDE 300; 12.5 MG/1; MG/1
1 TABLET, FILM COATED ORAL DAILY
Qty: 90 TABLET | Refills: 1 | Status: SHIPPED | OUTPATIENT
Start: 2024-07-08

## 2024-07-25 DIAGNOSIS — I10 BENIGN HYPERTENSION: ICD-10-CM

## 2024-07-25 RX ORDER — HYDROCHLOROTHIAZIDE 12.5 MG/1
12.5 TABLET ORAL DAILY
Qty: 90 TABLET | Refills: 1 | Status: SHIPPED | OUTPATIENT
Start: 2024-07-25

## 2024-07-25 NOTE — TELEPHONE ENCOUNTER
Caller: Lisa Cruz    Relationship: Self    Best call back number: [phone]     Requested Prescriptions:   Requested Prescriptions     Pending Prescriptions Disp Refills    hydroCHLOROthiazide 12.5 MG tablet 90 tablet 1     Sig: Take 1 tablet by mouth Daily.        Pharm[facility] sent[id number] [place] [address] AT [facility] [address] - [phone] PH - [phone] FX     Last office visit with prescribing clinician: 3/25/2024   Last telemedicine visit with prescribing clinician: Visit date not found   Next office visit with prescribing clinician: 10/1/2024     Additional details provided by patient:     Does the patient have less than a 3 day supply:  [] Yes  [x] No    Would you like a call back once the refill request has been completed: [] Yes [x] No    If the office needs to give you a call back, can they leave a voicemail: [] Yes [x] No    Ann Marie Mcbride Rep   07/25/24 09:41 EDT

## 2024-10-01 ENCOUNTER — OFFICE VISIT (OUTPATIENT)
Dept: INTERNAL MEDICINE | Facility: CLINIC | Age: 74
End: 2024-10-01
Payer: MEDICARE

## 2024-10-01 VITALS
SYSTOLIC BLOOD PRESSURE: 130 MMHG | HEART RATE: 62 BPM | WEIGHT: 141 LBS | TEMPERATURE: 98.2 F | BODY MASS INDEX: 23.49 KG/M2 | DIASTOLIC BLOOD PRESSURE: 82 MMHG | HEIGHT: 65 IN | OXYGEN SATURATION: 97 %

## 2024-10-01 DIAGNOSIS — Z13.228 SCREENING FOR ENDOCRINE, METABOLIC AND IMMUNITY DISORDER: ICD-10-CM

## 2024-10-01 DIAGNOSIS — Z13.0 SCREENING FOR DISORDER OF BLOOD AND BLOOD-FORMING ORGANS: ICD-10-CM

## 2024-10-01 DIAGNOSIS — J98.4 MILD OBSTRUCTION OF PULMONARY AIRFLOW: ICD-10-CM

## 2024-10-01 DIAGNOSIS — F41.9 ANXIETY: ICD-10-CM

## 2024-10-01 DIAGNOSIS — Z00.00 ENCOUNTER FOR SUBSEQUENT ANNUAL WELLNESS VISIT (AWV) IN MEDICARE PATIENT: Primary | ICD-10-CM

## 2024-10-01 DIAGNOSIS — I10 BENIGN HYPERTENSION: ICD-10-CM

## 2024-10-01 DIAGNOSIS — I63.30 CEREBROVASCULAR ACCIDENT (CVA) DUE TO THROMBOSIS OF CEREBRAL ARTERY: ICD-10-CM

## 2024-10-01 DIAGNOSIS — Z13.0 SCREENING FOR ENDOCRINE, METABOLIC AND IMMUNITY DISORDER: ICD-10-CM

## 2024-10-01 DIAGNOSIS — Z13.29 SCREENING FOR ENDOCRINE, METABOLIC AND IMMUNITY DISORDER: ICD-10-CM

## 2024-10-01 DIAGNOSIS — Z00.00 ENCOUNTER FOR ANNUAL PHYSICAL EXAMINATION EXCLUDING GYNECOLOGICAL EXAMINATION IN A PATIENT OLDER THAN 17 YEARS: ICD-10-CM

## 2024-10-01 DIAGNOSIS — Z13.1 SCREENING FOR DIABETES MELLITUS: ICD-10-CM

## 2024-10-01 DIAGNOSIS — E78.5 HYPERLIPIDEMIA, UNSPECIFIED HYPERLIPIDEMIA TYPE: ICD-10-CM

## 2024-10-01 DIAGNOSIS — R25.2 LEG CRAMP: ICD-10-CM

## 2024-10-01 RX ORDER — BUSPIRONE HYDROCHLORIDE 5 MG/1
5 TABLET ORAL 3 TIMES DAILY PRN
Qty: 90 TABLET | Refills: 1 | Status: SHIPPED | OUTPATIENT
Start: 2024-10-01

## 2024-10-01 NOTE — PROGRESS NOTES
Subjective   The ABCs of the Annual Wellness Visit  Medicare Wellness Visit      Lisa Cruz is a 74 y.o. patient who presents for a Medicare Wellness Visit.    The following portions of the patient's history were reviewed and   updated as appropriate: allergies, current medications, past family history, past medical history, past social history, past surgical history, and problem list.    Compared to one year ago, the patient's physical   health is the same.  Compared to one year ago, the patient's mental   health is the same. Anxiety is increased at this very moment, but overall mental health is good.      Recent Hospitalizations:  She was not admitted to the hospital during the last year.     Current Medical Providers:  Patient Care Team:  Linda Lipscomb APRN as PCP - General (Family Medicine)    Outpatient Medications Prior to Visit   Medication Sig Dispense Refill    albuterol sulfate  (90 Base) MCG/ACT inhaler Inhale 2 puffs Every 4 (Four) Hours As Needed for Wheezing. 18 g 3    Calcium-Vitamin D-Vitamin K (Viactiv Calcium Plus D) 650-12.5-40 MG-MCG-MCG chewable tablet       carvedilol (COREG) 25 MG tablet TAKE 1 TABLET BY MOUTH TWICE DAILY WITH MEALS 180 tablet 3    cholecalciferol (VITAMIN D3) 1000 units tablet Take 1 tablet by mouth 2 (Two) Times a Day.      clopidogrel (PLAVIX) 75 MG tablet TAKE 1 TABLET BY MOUTH DAILY 90 tablet 3    Diclofenac Sodium (VOLTAREN) 1 % gel gel Apply 4 g topically to the appropriate area as directed 4 (Four) Times a Day As Needed (for muscle and joint pain). 100 g 0    hydroCHLOROthiazide 12.5 MG tablet Take 1 tablet by mouth Daily. 90 tablet 1    irbesartan-hydrochlorothiazide (AVALIDE) 300-12.5 MG tablet TAKE 1 TABLET BY MOUTH DAILY 90 tablet 1    LUMIGAN 0.01 % ophthalmic drops   0    Magnesium 400 MG tablet       Melatonin 1 MG/4ML liquid       pravastatin (PRAVACHOL) 40 MG tablet Take 0.5 tablets by mouth Daily. 90 tablet 1    fluoruracil (CARAC) 0.5  "% cream Apply 1 Application topically to the appropriate area as directed Daily. 5 days       No facility-administered medications prior to visit.     No opioid medication identified on active medication list. I have reviewed chart for other potential  high risk medication/s and harmful drug interactions in the elderly.      Aspirin is not on active medication list.  Aspirin use is not indicated based on review of current medical condition/s. Risk of harm outweighs potential benefits.  .    Patient Active Problem List   Diagnosis    Anxiety    Cerebrovascular accident    Benign hypertension    Hyperlipidemia    Insomnia    Ocular hypertension    Hemorrhoids     Advance Care Planning Advance Directive is not on file.  ACP discussion was held with the patient during this visit. Patient has an advance directive (not in EMR), copy requested.            Objective   Vitals:    10/01/24 0838   BP: 130/82   Pulse: 62   Temp: 98.2 °F (36.8 °C)   SpO2: 97%   Weight: 64 kg (141 lb)   Height: 165.1 cm (65\")   PainSc: 0-No pain       Estimated body mass index is 23.46 kg/m² as calculated from the following:    Height as of this encounter: 165.1 cm (65\").    Weight as of this encounter: 64 kg (141 lb).    BMI is within normal parameters. No other follow-up for BMI required.       Does the patient have evidence of cognitive impairment? No                                                                                                Health  Risk Assessment    Smoking Status:  Social History     Tobacco Use   Smoking Status Never   Smokeless Tobacco Never     Alcohol Consumption:  Social History     Substance and Sexual Activity   Alcohol Use No    Comment: occ       Fall Risk Screen  STEADI Fall Risk Assessment was completed, and patient is at HIGH risk for falls. Assessment completed on:10/1/2024    Depression Screening:      10/1/2024     8:43 AM   PHQ-2/PHQ-9 Depression Screening   Little Interest or Pleasure in Doing Things " 0-->not at all   Feeling Down, Depressed or Hopeless 0-->not at all   PHQ-9: Brief Depression Severity Measure Score 0     Health Habits and Functional and Cognitive Screening:      10/1/2024     8:43 AM   Functional & Cognitive Status   Do you have difficulty preparing food and eating? No   Do you have difficulty bathing yourself, getting dressed or grooming yourself? No   Do you have difficulty using the toilet? No   Do you have difficulty moving around from place to place? No   Do you have trouble with steps or getting out of a bed or a chair? No   Current Diet Well Balanced Diet   Dental Exam Up to date   Eye Exam Up to date   Exercise (times per week) 0 times per week   Current Exercises Include No Regular Exercise   Do you need help using the phone?  No   Are you deaf or do you have serious difficulty hearing?  No   Do you need help to go to places out of walking distance? No   Do you need help shopping? No   Do you need help preparing meals?  No   Do you need help with housework?  No   Do you need help with laundry? No   Do you need help taking your medications? No   Do you need help managing money? No   Do you ever drive or ride in a car without wearing a seat belt? No   Have you felt unusual stress, anger or loneliness in the last month? Yes   Who do you live with? Spouse   If you need help, do you have trouble finding someone available to you? No   Have you been bothered in the last four weeks by sexual problems? No   Do you have difficulty concentrating, remembering or making decisions? No           Age-appropriate Screening Schedule:  Refer to the list below for future screening recommendations based on patient's age, sex and/or medical conditions. Orders for these recommended tests are listed in the plan section. The patient has been provided with a written plan.    Health Maintenance List  Health Maintenance   Topic Date Due    COVID-19 Vaccine (7 - 2023-24 season) 12/21/2024 (Originally 9/1/2024)     RSV Vaccine - Adults (1 - 1-dose 60+ series) 03/25/2025 (Originally 4/11/2009)    INFLUENZA VACCINE  03/31/2025 (Originally 8/1/2024)    LIPID PANEL  03/29/2025    ANNUAL WELLNESS VISIT  10/01/2025    DXA SCAN  12/22/2025    MAMMOGRAM  04/01/2026    TDAP/TD VACCINES (4 - Td or Tdap) 06/20/2027    COLORECTAL CANCER SCREENING  02/02/2031    HEPATITIS C SCREENING  Completed    Pneumococcal Vaccine 65+  Completed    ZOSTER VACCINE  Completed                                                                                                                                                CMS Preventative Services Quick Reference  Risk Factors Identified During Encounter  Immunizations Discussed/Encouraged: Influenza, COVID19, and RSV (Respiratory Syncytial Virus)  Polypharmacy: Medication List reviewed and Medications are appropriate for patient    The above risks/problems have been discussed with the patient.  Pertinent information has been shared with the patient in the After Visit Summary.  An After Visit Summary and PPPS were made available to the patient.    Follow Up:   Next Medicare Wellness visit to be scheduled in 1 year.         Additional E&M Note during same encounter follows:  Patient has additional, significant, and separately identifiable condition(s)/problem(s) that require work above and beyond the Medicare Wellness Visit     Chief Complaint  Medicare Wellness-subsequent    Subjective   HPI  Lisa is also being seen today for an annual adult preventative physical exam.     HTN, hyperlipidemia, h/o CVA.  Monitors blood pressure intermittently at home.  WNL when checked.  Denies chest pain, dyspnea, orthopnea, palpitations, lower extremity edema, confusion, headaches, weakness, visual disturbances.    Increased anxiety recently due to life circumstances including MIL having a stroke.  She constantly feels stressed, tense.  Denies depressed mood, anhedonia, feelings of worthlessness, difficulty concentrating,  "impaired memory, SI, HI, panic attacks, weight change.    Review of Systems   All other systems reviewed and are negative.     Frequent leg cramps for the past year.  Worse at night.  Walks them off, typically on a cool floor.  States this happened prior to CVA in the past, understandably worries her.          Objective   Vital Signs:  /82   Pulse 62   Temp 98.2 °F (36.8 °C)   Ht 165.1 cm (65\")   Wt 64 kg (141 lb)   SpO2 97%   BMI 23.46 kg/m²   Physical Exam  Constitutional:       Appearance: She is well-developed. She is not ill-appearing.   HENT:      Head: Normocephalic.      Right Ear: Tympanic membrane, ear canal and external ear normal.      Left Ear: Tympanic membrane, ear canal and external ear normal.      Nose: Nose normal.      Mouth/Throat:      Mouth: Mucous membranes are moist.      Pharynx: Oropharynx is clear. Uvula midline.   Eyes:      Extraocular Movements: Extraocular movements intact.      Conjunctiva/sclera: Conjunctivae normal.      Pupils: Pupils are equal, round, and reactive to light.      Comments: Right lid deformity after CVA   Neck:      Thyroid: No thyromegaly.      Vascular: No carotid bruit.   Cardiovascular:      Rate and Rhythm: Normal rate and regular rhythm.      Pulses:           Radial pulses are 2+ on the right side and 2+ on the left side.        Dorsalis pedis pulses are 2+ on the right side and 2+ on the left side.      Heart sounds: Normal heart sounds.   Pulmonary:      Effort: Pulmonary effort is normal.      Breath sounds: Normal breath sounds.   Abdominal:      General: Bowel sounds are normal.      Palpations: Abdomen is soft.      Tenderness: There is no abdominal tenderness.   Musculoskeletal:         General: No tenderness or deformity. Normal range of motion.      Cervical back: Full passive range of motion without pain, normal range of motion and neck supple.      Right lower leg: No edema.      Left lower leg: No edema.   Lymphadenopathy:      " Cervical: No cervical adenopathy.   Skin:     General: Skin is warm.      Capillary Refill: Capillary refill takes less than 2 seconds.   Neurological:      Mental Status: She is alert and oriented to person, place, and time.      Sensory: No sensory deficit.      Coordination: Coordination normal.      Gait: Gait normal.      Comments: CN II-XII normal   Psychiatric:         Attention and Perception: Attention normal.         Mood and Affect: Mood and affect normal.         Speech: Speech normal.         Behavior: Behavior normal.         Thought Content: Thought content normal.                 Assessment and Plan               Encounter for subsequent annual wellness visit (AWV) in Medicare patient    Encounter for annual physical examination excluding gynecological examination in a patient older than 17 years    Benign hypertension    Hyperlipidemia, unspecified hyperlipidemia type     Cerebrovascular accident (CVA) due to thrombosis of cerebral artery    Mild obstruction of pulmonary airflow    Anxiety    Leg cramp    Screening for disorder of blood and blood-forming organs    Screening for endocrine, metabolic and immunity disorder    Screening for diabetes mellitus      Orders Placed This Encounter   Procedures    Comprehensive Metabolic Panel     Order Specific Question:   Release to patient     Answer:   Routine Release [[id number]]    Magnesium     Order Specific Question:   Release to patient     Answer:   Routine Release [[id number]]    TSH Rfx On Abnormal To Free T4     Order Specific Question:   Release to patient     Answer:   Routine Release [[id number]]    Hemoglobin A1c     Order Specific Question:   Release to patient     Answer:   Routine Release [[id number]]    CBC & Differential     Order Specific Question:   Manual Differential     Answer:   No     Order Specific Question:   Release to patient     Answer:   Routine Release [[id number]]     New Medications Ordered This Visit   Medications     busPIRone (BUSPAR) 5 MG tablet     Sig: Take 1 tablet by mouth 3 (Three) Times a Day As Needed (anxiety).     Dispense:  90 tablet     Refill:  1          Follow Up   Return in about 6 months (around 4/1/2025) for Next scheduled follow up.  Patient was given instructions and counseling regarding her condition or for health maintenance advice. Please see specific information pulled into the AVS if appropriate.

## 2024-10-01 NOTE — PATIENT INSTRUCTIONS
Medicare Wellness  Personal Prevention Plan of Service     Date of Office Visit:    Encounter Provider:  OSCAR Pineda  Place of Service:  [facility]  Patient Name: Lisa Cruz  :  1950    As part of the Medicare Wellness portion of your visit today, we are providing you with this personalized preventive plan of services (PPPS). This plan is based upon recommendations of the [place] Preventive Services Task Force (USPSTF) and the Advisory Committee on Immunization Practices (ACIP).    This lists the preventive care services that should be considered, and provides dates of when you are due. Items listed as completed are up-to-date and do not require any further intervention.    Health Maintenance   Topic Date Due    COVID-19 Vaccine (2023-24 season) 2024 (Originally 2024)    RSV Vaccine - Adults (1 - 1-dose 60+ series) 2025 (Originally 2009)    INFLUENZA VACCINE  2025 (Originally 2024)    LIPID PANEL  2025    ANNUAL WELLNESS VISIT  10/01/2025    DXA SCAN  2025    MAMMOGRAM  2026    TDAP/TD VACCINES (4 - Td or Tdap) 2027    COLORECTAL CANCER SCREENING  2031    HEPATITIS C SCREENING  Completed    Pneumococcal Vaccine 65+  Completed    ZOSTER VACCINE  Completed       Orders Placed This Encounter   Procedures    Comprehensive Metabolic Panel     Order Specific Question:   Release to patient     Answer:   Routine Release [[id number]]    Magnesium     Order Specific Question:   Release to patient     Answer:   Routine Release [[id number]]    TSH Rfx On Abnormal To Free T4     Order Specific Question:   Release to patient     Answer:   Routine Release [[id number]]    Hemoglobin A1c     Order Specific Question:   Release to patient     Answer:   Routine Release [[id number]]    CBC & Differential     Order Specific Question:   Manual Differential     Answer:   No     Order Specific Question:    Release to patient     Answer:   Routine Release [[id number]]       Return in about 6 months (around 4/1/2025) for Next scheduled follow up.

## 2024-10-02 LAB
ALBUMIN SERPL-MCNC: 4.4 G/DL (ref 3.5–5.2)
ALBUMIN/GLOB SERPL: 2 G/DL
ALP SERPL-CCNC: 100 U/L (ref 39–117)
ALT SERPL-CCNC: 14 U/L (ref 1–33)
AST SERPL-CCNC: 15 U/L (ref 1–32)
BASOPHILS # BLD AUTO: 0.03 10*3/MM3 (ref 0–0.2)
BASOPHILS NFR BLD AUTO: 0.6 % (ref 0–1.5)
BILIRUB SERPL-MCNC: 0.6 MG/DL (ref 0–1.2)
BUN SERPL-MCNC: 16 MG/DL (ref 8–23)
BUN/CREAT SERPL: 16.7 (ref 7–25)
CALCIUM SERPL-MCNC: 10.5 MG/DL (ref 8.6–10.5)
CHLORIDE SERPL-SCNC: 102 MMOL/L (ref 98–107)
CO2 SERPL-SCNC: 30.1 MMOL/L (ref 22–29)
CREAT SERPL-MCNC: 0.96 MG/DL (ref 0.57–1)
EGFRCR SERPLBLD CKD-EPI 2021: 61.8 ML/MIN/1.73
EOSINOPHIL # BLD AUTO: 0.13 10*3/MM3 (ref 0–0.4)
EOSINOPHIL NFR BLD AUTO: 2.4 % (ref 0.3–6.2)
ERYTHROCYTE [DISTWIDTH] IN BLOOD BY AUTOMATED COUNT: 12.2 % (ref 12.3–15.4)
GLOBULIN SER CALC-MCNC: 2.2 GM/DL
GLUCOSE SERPL-MCNC: 94 MG/DL (ref 65–99)
HBA1C MFR BLD: 5.3 % (ref 4.8–5.6)
HCT VFR BLD AUTO: 39 % (ref 34–46.6)
HGB BLD-MCNC: 13.6 G/DL (ref 12–15.9)
IMM GRANULOCYTES # BLD AUTO: 0.02 10*3/MM3 (ref 0–0.05)
IMM GRANULOCYTES NFR BLD AUTO: 0.4 % (ref 0–0.5)
LYMPHOCYTES # BLD AUTO: 1.43 10*3/MM3 (ref 0.7–3.1)
LYMPHOCYTES NFR BLD AUTO: 26.5 % (ref 19.6–45.3)
MAGNESIUM SERPL-MCNC: 2.2 MG/DL (ref 1.6–2.4)
MCH RBC QN AUTO: 32.3 PG (ref 26.6–33)
MCHC RBC AUTO-ENTMCNC: 34.9 G/DL (ref 31.5–35.7)
MCV RBC AUTO: 92.6 FL (ref 79–97)
MONOCYTES # BLD AUTO: 0.4 10*3/MM3 (ref 0.1–0.9)
MONOCYTES NFR BLD AUTO: 7.4 % (ref 5–12)
NEUTROPHILS # BLD AUTO: 3.38 10*3/MM3 (ref 1.7–7)
NEUTROPHILS NFR BLD AUTO: 62.7 % (ref 42.7–76)
NRBC BLD AUTO-RTO: 0 /100 WBC (ref 0–0.2)
PLATELET # BLD AUTO: 249 10*3/MM3 (ref 140–450)
POTASSIUM SERPL-SCNC: 4 MMOL/L (ref 3.5–5.2)
PROT SERPL-MCNC: 6.6 G/DL (ref 6–8.5)
RBC # BLD AUTO: 4.21 10*6/MM3 (ref 3.77–5.28)
SODIUM SERPL-SCNC: 141 MMOL/L (ref 136–145)
TSH SERPL DL<=0.005 MIU/L-ACNC: 2.76 UIU/ML (ref 0.27–4.2)
WBC # BLD AUTO: 5.39 10*3/MM3 (ref 3.4–10.8)

## 2024-12-14 DIAGNOSIS — I63.30 CEREBROVASCULAR ACCIDENT (CVA) DUE TO THROMBOSIS OF CEREBRAL ARTERY: ICD-10-CM

## 2024-12-14 RX ORDER — CLOPIDOGREL BISULFATE 75 MG/1
75 TABLET ORAL DAILY
Qty: 90 TABLET | Refills: 3 | Status: SHIPPED | OUTPATIENT
Start: 2024-12-14

## 2025-01-07 RX ORDER — IRBESARTAN AND HYDROCHLOROTHIAZIDE 300; 12.5 MG/1; MG/1
1 TABLET, FILM COATED ORAL DAILY
Qty: 90 TABLET | Refills: 1 | Status: SHIPPED | OUTPATIENT
Start: 2025-01-07

## 2025-01-08 DIAGNOSIS — I10 BENIGN HYPERTENSION: ICD-10-CM

## 2025-01-08 RX ORDER — HYDROCHLOROTHIAZIDE 12.5 MG/1
12.5 TABLET ORAL DAILY
Qty: 90 TABLET | Refills: 1 | Status: SHIPPED | OUTPATIENT
Start: 2025-01-08

## 2025-01-22 ENCOUNTER — OFFICE VISIT (OUTPATIENT)
Dept: INTERNAL MEDICINE | Facility: CLINIC | Age: 75
End: 2025-01-22
Payer: MEDICARE

## 2025-01-22 VITALS
BODY MASS INDEX: 23.82 KG/M2 | OXYGEN SATURATION: 96 % | HEIGHT: 65 IN | HEART RATE: 67 BPM | TEMPERATURE: 98.1 F | SYSTOLIC BLOOD PRESSURE: 136 MMHG | DIASTOLIC BLOOD PRESSURE: 78 MMHG | WEIGHT: 143 LBS

## 2025-01-22 DIAGNOSIS — Z80.7 FH: LYMPHOMA: ICD-10-CM

## 2025-01-22 DIAGNOSIS — R19.03 RLQ ABDOMINAL MASS: Primary | ICD-10-CM

## 2025-01-22 DIAGNOSIS — I10 BENIGN HYPERTENSION: ICD-10-CM

## 2025-01-22 DIAGNOSIS — E78.2 MIXED HYPERLIPIDEMIA: ICD-10-CM

## 2025-01-22 NOTE — PROGRESS NOTES
Office Visit      Patient Name: Lisa Cruz  : 1950   MRN: [id number]     Chief Complaint:    Chief Complaint   Patient presents with    KNOT ON ABDOMEN       History of Present Illness: Lisa Cruz is a 74 y.o. female who is here today with concern regarding a lump in her right lower abdomen she first noticed 3-4 weeks ago.  More prominent when standing up.  Harder to feel it when she's lying down.  No pain.  FH of lymphoma, which understandably concerns her.  She has had no fever, chills, weight change.  No reported increase in fatigue, change in bowel habit or appetite, excessive lifting.      HTN, hyperlipidemia.  Taking medications as prescribed.  No reported chest pain, dyspnea, orthopnea, palpitations, lower extremity edema, confusion, headaches, weakness, visual disturbances.    Subjective      I have reviewed and the following portions of the patient's history were updated as appropriate: past family history, past medical history, past social history, past surgical history and problem list.      Current Outpatient Medications:     albuterol sulfate  (90 Base) MCG/ACT inhaler, Inhale 2 puffs Every 4 (Four) Hours As Needed for Wheezing., Disp: 18 g, Rfl: 3    busPIRone (BUSPAR) 5 MG tablet, Take 1 tablet by mouth 3 (Three) Times a Day As Needed (anxiety)., Disp: 90 tablet, Rfl: 1    Calcium-Vitamin D-Vitamin K (Viactiv Calcium Plus D) 650-12.5-40 MG-MCG-MCG chewable tablet, , Disp: , Rfl:     carvedilol (COREG) 25 MG tablet, TAKE 1 TABLET BY MOUTH TWICE DAILY WITH MEALS, Disp: 180 tablet, Rfl: 3    cholecalciferol (VITAMIN D3) 1000 units tablet, Take 1 tablet by mouth 2 (Two) Times a Day., Disp: , Rfl:     clopidogrel (PLAVIX) 75 MG tablet, TAKE 1 TABLET BY MOUTH DAILY, Disp: 90 tablet, Rfl: 3    Diclofenac Sodium (VOLTAREN) 1 % gel gel, Apply 4 g topically to the appropriate area as directed 4 (Four) Times a Day As Needed (for muscle and joint pain)., Disp: 100 g, Rfl: 0     "hydroCHLOROthiazide 12.5 MG tablet, TAKE 1 TABLET BY MOUTH DAILY, Disp: 90 tablet, Rfl: 1    irbesartan-hydrochlorothiazide (AVALIDE) 300-12.5 MG tablet, TAKE 1 TABLET BY MOUTH DAILY, Disp: 90 tablet, Rfl: 1    LUMIGAN 0.01 % ophthalmic drops, , Disp: , Rfl: 0    Magnesium 400 MG tablet, , Disp: , Rfl:     Melatonin 1 MG/4ML liquid, , Disp: , Rfl:     pravastatin (PRAVACHOL) 40 MG tablet, Take 0.5 tablets by mouth Daily., Disp: 90 tablet, Rfl: 1    Allergies   Allergen Reactions    Nickel Rash       Objective     Physical Exam:  Vital Signs:   Vitals:    01/22/25 1517   BP: 136/78   Pulse: 67   Temp: 98.1 °F (36.7 °C)   SpO2: 96%   Weight: 64.9 kg (143 lb)   Height: 165.1 cm (65\")     Body mass index is 23.8 kg/m².  BMI is within normal parameters. No other follow-up for BMI required.       Physical Exam  Constitutional:       Appearance: She is not ill-appearing.   HENT:      Head: Normocephalic.      Right Ear: External ear normal.      Left Ear: External ear normal.   Eyes:      Conjunctiva/sclera: Conjunctivae normal.      Pupils: Pupils are equal, round, and reactive to light.   Cardiovascular:      Rate and Rhythm: Normal rate and regular rhythm.      Pulses:           Radial pulses are 2+ on the right side and 2+ on the left side.        Dorsalis pedis pulses are 2+ on the right side and 2+ on the left side.      Heart sounds: Normal heart sounds.   Pulmonary:      Effort: Pulmonary effort is normal.      Breath sounds: Normal breath sounds.   Abdominal:      Palpations: Abdomen is soft.      Tenderness: There is no abdominal tenderness.          Comments: Chaperone present: Lizzie Hall CMA   Musculoskeletal:      Cervical back: Normal range of motion and neck supple.      Right lower leg: No edema.      Left lower leg: No edema.   Lymphadenopathy:      Lower Body: No right inguinal adenopathy. No left inguinal adenopathy.   Skin:     General: Skin is warm.      Capillary Refill: Capillary refill takes " less than 2 seconds.   Neurological:      Mental Status: She is alert and oriented to person, place, and time.      Coordination: Coordination normal.      Gait: Gait normal.   Psychiatric:         Mood and Affect: Mood normal.         Behavior: Behavior normal.         Thought Content: Thought content normal.             Assessment / Plan      Assessment/Plan:   Diagnoses and all orders for this visit:    1. RLQ abdominal mass (Primary)  -     US abdomen limited; Future    2. FH: lymphoma  -     US abdomen limited; Future    3. Benign hypertension  4. Mixed hyperlipidemia        - Follow heart healthy diet.  Keep sodium intake < 1500 mg per day.  Avoid processed & fast foods.          - Exercise as tolerated, with a goal of 30 minutes of moderate exercise most days.         - Take medications as prescribed.        Follow Up:   Return if symptoms worsen or fail to improve.    Patient was given instructions and counseling regarding her condition or for health maintenance advice. Please see specific information pulled into the AVS if appropriate.       Primary Care [address] [place]     Please note that portions of this note may have been completed with a voice recognition program. Efforts were made to edit dictation, but occasionally words are mistranscribed.

## 2025-02-04 DIAGNOSIS — I10 BENIGN HYPERTENSION: ICD-10-CM

## 2025-02-04 RX ORDER — HYDROCHLOROTHIAZIDE 12.5 MG/1
12.5 TABLET ORAL DAILY
Qty: 90 TABLET | Refills: 1 | Status: SHIPPED | OUTPATIENT
Start: 2025-02-04

## 2025-02-04 NOTE — TELEPHONE ENCOUNTER
Caller: Lisa Cruz    Relationship: Self    Best call back number:  [phone]     Requested Prescriptions:   Requested Prescriptions     Pending Prescriptions Disp Refills    hydroCHLOROthiazide 12.5 MG tablet 90 tablet 1     Sig: Take 1 tablet by mouth Daily.        Pharmacy where request should be sent: [id number] [place] [address] AT [facility] [address] - [phone] PH - [phone] FX     Last office visit with prescribing clinician: 1/22/2025   Last telemedicine visit with prescribing clinician: Visit date not found   Next office visit with prescribing clinician: 4/1/2025     Additional details provided by patient: PATIENT ADVISED DOESN'T HAVE REFILLS LEFT. I ADVISED PCP SENT IN A NEW RX ON 1/8/25 OF A 90 DAY SUPPLY.    Does the patient have less than a 3 day supply:  [x] Yes  [] No    Would you like a call back once the refill request has been completed: [] Yes [x] No    If the office needs to give you a call back, can they leave a voicemail: [] Yes [x] No    Ann Marie Waldrop Rep   02/04/25 10:25 EST

## 2025-02-12 ENCOUNTER — HOSPITAL ENCOUNTER (OUTPATIENT)
Facility: HOSPITAL | Age: 75
Discharge: HOME | End: 2025-02-12
Admitting: NURSE PRACTITIONER
Payer: MEDICARE

## 2025-02-12 DIAGNOSIS — Z80.7 FH: LYMPHOMA: ICD-10-CM

## 2025-02-12 DIAGNOSIS — R19.03 RLQ ABDOMINAL MASS: ICD-10-CM

## 2025-02-12 PROCEDURE — 76705 ECHO EXAM OF ABDOMEN: CPT

## 2025-02-14 NOTE — PROGRESS NOTES
Ultrasound of abdomen is normal, no indication of hernia, lipoma/fatty tumor, mass.  If she continues to have no pain, no change in bowel habit recommend continuing to monitor.

## 2025-02-25 ENCOUNTER — TRANSCRIBE ORDERS (OUTPATIENT)
Dept: ADMINISTRATIVE | Facility: HOSPITAL | Age: 75
End: 2025-02-25
Payer: MEDICARE

## 2025-02-25 DIAGNOSIS — Z12.31 BREAST CANCER SCREENING BY MAMMOGRAM: Primary | ICD-10-CM

## 2025-03-03 RX ORDER — PRAVASTATIN SODIUM 40 MG
20 TABLET ORAL DAILY
Qty: 90 TABLET | Refills: 1 | Status: SHIPPED | OUTPATIENT
Start: 2025-03-03

## 2025-04-01 ENCOUNTER — OFFICE VISIT (OUTPATIENT)
Dept: INTERNAL MEDICINE | Facility: CLINIC | Age: 75
End: 2025-04-01
Payer: MEDICARE

## 2025-04-01 VITALS
OXYGEN SATURATION: 96 % | WEIGHT: 144 LBS | HEART RATE: 73 BPM | TEMPERATURE: 98.7 F | DIASTOLIC BLOOD PRESSURE: 68 MMHG | SYSTOLIC BLOOD PRESSURE: 102 MMHG | BODY MASS INDEX: 23.99 KG/M2 | HEIGHT: 65 IN

## 2025-04-01 DIAGNOSIS — I10 BENIGN HYPERTENSION: Primary | ICD-10-CM

## 2025-04-01 DIAGNOSIS — Z86.73 HISTORY OF CVA (CEREBROVASCULAR ACCIDENT): ICD-10-CM

## 2025-04-01 DIAGNOSIS — E78.2 MIXED HYPERLIPIDEMIA: ICD-10-CM

## 2025-04-01 NOTE — PROGRESS NOTES
Office Visit      Patient Name: Lisa Cruz  : 1950   MRN: [id number]     Chief Complaint:    Chief Complaint   Patient presents with    Hypertension       History of Present Illness: Lisa Cruz is a 74 y.o. female who is here today for follow up of HTN, HLD.  CVA in the past.  Taking medication as prescribed.  Noticed one episode of dizziness while bent forward working in the yard.  Otherwise feels well.  Denies chest pain, dyspnea, orthopnea, palpitations, lower extremity edema, confusion, headaches, weakness, visual disturbances.     Subjective      I have reviewed and the following portions of the patient's history were updated as appropriate: past family history, past medical history, past social history, past surgical history and problem list.      Current Outpatient Medications:     albuterol sulfate  (90 Base) MCG/ACT inhaler, Inhale 2 puffs Every 4 (Four) Hours As Needed for Wheezing., Disp: 18 g, Rfl: 3    busPIRone (BUSPAR) 5 MG tablet, Take 1 tablet by mouth 3 (Three) Times a Day As Needed (anxiety)., Disp: 90 tablet, Rfl: 1    Calcium-Vitamin D-Vitamin K (Viactiv Calcium Plus D) 650-12.5-40 MG-MCG-MCG chewable tablet, , Disp: , Rfl:     carvedilol (COREG) 25 MG tablet, TAKE 1 TABLET BY MOUTH TWICE DAILY WITH MEALS, Disp: 180 tablet, Rfl: 3    cholecalciferol (VITAMIN D3) 1000 units tablet, Take 1 tablet by mouth 2 (Two) Times a Day., Disp: , Rfl:     clopidogrel (PLAVIX) 75 MG tablet, TAKE 1 TABLET BY MOUTH DAILY, Disp: 90 tablet, Rfl: 3    Diclofenac Sodium (VOLTAREN) 1 % gel gel, Apply 4 g topically to the appropriate area as directed 4 (Four) Times a Day As Needed (for muscle and joint pain)., Disp: 100 g, Rfl: 0    hydroCHLOROthiazide 12.5 MG tablet, Take 1 tablet by mouth Daily., Disp: 90 tablet, Rfl: 1    irbesartan-hydrochlorothiazide (AVALIDE) 300-12.5 MG tablet, TAKE 1 TABLET BY MOUTH DAILY, Disp: 90 tablet, Rfl: 1    LUMIGAN 0.01 % ophthalmic drops, , Disp: ,  "Rfl: 0    Magnesium 400 MG tablet, , Disp: , Rfl:     Melatonin 1 MG/4ML liquid, , Disp: , Rfl:     pravastatin (PRAVACHOL) 40 MG tablet, TAKE 1/2 TABLET BY MOUTH DAILY, Disp: 90 tablet, Rfl: 1    Allergies   Allergen Reactions    Nickel Rash       Objective     Physical Exam:  Vital Signs:   Vitals:    04/01/25 0937   BP: 102/68   Pulse: 73   Temp: 98.7 °F (37.1 °C)   SpO2: 96%   Weight: 65.3 kg (144 lb)   Height: 165.1 cm (65\")     Body mass index is 23.96 kg/m².  BMI is within normal parameters. No other follow-up for BMI required.       Physical Exam  Constitutional:       Appearance: She is not ill-appearing.   HENT:      Head: Normocephalic.      Right Ear: External ear normal.      Left Ear: External ear normal.   Eyes:      Conjunctiva/sclera: Conjunctivae normal.      Pupils: Pupils are equal, round, and reactive to light.   Cardiovascular:      Rate and Rhythm: Normal rate and regular rhythm.      Heart sounds: Normal heart sounds.   Pulmonary:      Effort: Pulmonary effort is normal.      Breath sounds: Normal breath sounds.   Musculoskeletal:      Cervical back: Normal range of motion and neck supple.   Skin:     General: Skin is warm.      Capillary Refill: Capillary refill takes less than 2 seconds.   Neurological:      Mental Status: She is alert and oriented to person, place, and time.      Coordination: Coordination normal.      Gait: Gait normal.   Psychiatric:         Mood and Affect: Mood normal.         Behavior: Behavior normal.         Thought Content: Thought content normal.           Assessment / Plan      Assessment/Plan:   Diagnoses and all orders for this visit:    1. Benign hypertension (Primary)        - Follow heart healthy diet.  Keep sodium intake < 1500 mg per day.  Avoid processed & fast foods.          - Exercise as tolerated, with a goal of 30 minutes of moderate exercise most days.         - Take medications as prescribed.        - Monitor for continued dizziness.  Blood " pressure lower than normal today, may need to titrate medication doses if dizziness associated with position changes.    2. Mixed hyperlipidemia  -     Lipid Panel    3. History of CVA (cerebrovascular accident)  -     Lipid Panel             Follow Up:   Return in about 6 months (around 10/1/2025) for Medicare Wellness.    Patient was given instructions and counseling regarding her condition or for health maintenance advice. Please see specific information pulled into the AVS if appropriate.       Primary Care [facility] [place]     Please note that portions of this note may have been completed with a voice recognition program. Efforts were made to edit dictation, but occasionally words are mistranscribed.

## 2025-04-08 DIAGNOSIS — I10 BENIGN HYPERTENSION: ICD-10-CM

## 2025-04-08 RX ORDER — CARVEDILOL 25 MG/1
25 TABLET ORAL 2 TIMES DAILY WITH MEALS
Qty: 180 TABLET | Refills: 3 | Status: SHIPPED | OUTPATIENT
Start: 2025-04-08

## 2025-04-29 ENCOUNTER — HOSPITAL ENCOUNTER (OUTPATIENT)
Dept: MAMMOGRAPHY | Facility: HOSPITAL | Age: 75
Discharge: HOME | End: 2025-04-29
Admitting: NURSE PRACTITIONER
Payer: MEDICARE

## 2025-04-29 DIAGNOSIS — Z12.31 BREAST CANCER SCREENING BY MAMMOGRAM: ICD-10-CM

## 2025-04-29 PROCEDURE — 77063 BREAST TOMOSYNTHESIS BI: CPT

## 2025-04-29 PROCEDURE — 77067 SCR MAMMO BI INCL CAD: CPT

## 2025-05-07 LAB
CHOLEST SERPL-MCNC: 156 MG/DL (ref 0–200)
HDLC SERPL-MCNC: 49 MG/DL (ref 40–60)
LDLC SERPL CALC-MCNC: 85 MG/DL (ref 0–100)
TRIGL SERPL-MCNC: 123 MG/DL (ref 0–150)
VLDLC SERPL CALC-MCNC: 22 MG/DL (ref 5–40)

## 2025-07-07 RX ORDER — IRBESARTAN AND HYDROCHLOROTHIAZIDE 300; 12.5 MG/1; MG/1
1 TABLET, FILM COATED ORAL DAILY
Qty: 90 TABLET | Refills: 1 | Status: SHIPPED | OUTPATIENT
Start: 2025-07-07

## 2025-08-05 ENCOUNTER — PATIENT ROUNDING (BHMG ONLY) (OUTPATIENT)
Dept: URGENT CARE | Facility: CLINIC | Age: 75
End: 2025-08-05
Payer: MEDICARE

## 2025-08-05 ENCOUNTER — APPOINTMENT (OUTPATIENT)
Dept: CT IMAGING | Facility: HOSPITAL | Age: 75
End: 2025-08-05
Payer: MEDICARE

## 2025-08-05 ENCOUNTER — HOSPITAL ENCOUNTER (EMERGENCY)
Facility: HOSPITAL | Age: 75
Discharge: HOME | End: 2025-08-05
Attending: EMERGENCY MEDICINE | Admitting: EMERGENCY MEDICINE
Payer: MEDICARE

## 2025-08-05 ENCOUNTER — APPOINTMENT (OUTPATIENT)
Dept: GENERAL RADIOLOGY | Facility: HOSPITAL | Age: 75
End: 2025-08-05
Payer: MEDICARE

## 2025-08-05 VITALS
RESPIRATION RATE: 16 BRPM | TEMPERATURE: 98 F | DIASTOLIC BLOOD PRESSURE: 82 MMHG | OXYGEN SATURATION: 97 % | SYSTOLIC BLOOD PRESSURE: 148 MMHG | HEIGHT: 65 IN | BODY MASS INDEX: 23.82 KG/M2 | WEIGHT: 143 LBS | HEART RATE: 56 BPM

## 2025-08-05 DIAGNOSIS — S92.504A CLOSED NONDISPLACED FRACTURE OF PHALANX OF LESSER TOE OF RIGHT FOOT, UNSPECIFIED PHALANX, INITIAL ENCOUNTER: ICD-10-CM

## 2025-08-05 DIAGNOSIS — R55 SYNCOPE AND COLLAPSE: Primary | ICD-10-CM

## 2025-08-05 LAB
ALBUMIN SERPL-MCNC: 4.6 G/DL (ref 3.5–5.2)
ALBUMIN/GLOB SERPL: 1.4 G/DL
ALP SERPL-CCNC: 100 U/L (ref 39–117)
ALT SERPL W P-5'-P-CCNC: 15 U/L (ref 1–33)
ANION GAP SERPL CALCULATED.3IONS-SCNC: 14.9 MMOL/L (ref 5–15)
APTT PPP: 31.2 SECONDS (ref 70–100)
AST SERPL-CCNC: 19 U/L (ref 1–32)
BASOPHILS # BLD AUTO: 0.06 10*3/MM3 (ref 0–0.2)
BASOPHILS NFR BLD AUTO: 0.8 % (ref 0–1.5)
BILIRUB SERPL-MCNC: 0.8 MG/DL (ref 0–1.2)
BUN SERPL-MCNC: 16 MG/DL (ref 8–23)
BUN/CREAT SERPL: 18 (ref 7–25)
CALCIUM SPEC-SCNC: 10.5 MG/DL (ref 8.6–10.5)
CHLORIDE SERPL-SCNC: 98 MMOL/L (ref 98–107)
CO2 SERPL-SCNC: 26.1 MMOL/L (ref 22–29)
CREAT SERPL-MCNC: 0.89 MG/DL (ref 0.57–1)
DEPRECATED RDW RBC AUTO: 40.7 FL (ref 37–54)
EGFRCR SERPLBLD CKD-EPI 2021: 67.3 ML/MIN/1.73
EOSINOPHIL # BLD AUTO: 0.21 10*3/MM3 (ref 0–0.4)
EOSINOPHIL NFR BLD AUTO: 2.9 % (ref 0.3–6.2)
ERYTHROCYTE [DISTWIDTH] IN BLOOD BY AUTOMATED COUNT: 12.3 % (ref 12.3–15.4)
GEN 5 1HR TROPONIN T REFLEX: 9 NG/L
GLOBULIN UR ELPH-MCNC: 3.2 GM/DL
GLUCOSE SERPL-MCNC: 125 MG/DL (ref 65–99)
HCT VFR BLD AUTO: 39.1 % (ref 34–46.6)
HGB BLD-MCNC: 13.8 G/DL (ref 12–15.9)
HOLD SPECIMEN: NORMAL
HOLD SPECIMEN: NORMAL
IMM GRANULOCYTES # BLD AUTO: 0.02 10*3/MM3 (ref 0–0.05)
IMM GRANULOCYTES NFR BLD AUTO: 0.3 % (ref 0–0.5)
INR PPP: 1.01 (ref 0.9–1.1)
LYMPHOCYTES # BLD AUTO: 1.87 10*3/MM3 (ref 0.7–3.1)
LYMPHOCYTES NFR BLD AUTO: 25.4 % (ref 19.6–45.3)
MAGNESIUM SERPL-MCNC: 2.1 MG/DL (ref 1.6–2.4)
MCH RBC QN AUTO: 31.5 PG (ref 26.6–33)
MCHC RBC AUTO-ENTMCNC: 35.3 G/DL (ref 31.5–35.7)
MCV RBC AUTO: 89.3 FL (ref 79–97)
MONOCYTES # BLD AUTO: 0.48 10*3/MM3 (ref 0.1–0.9)
MONOCYTES NFR BLD AUTO: 6.5 % (ref 5–12)
NEUTROPHILS NFR BLD AUTO: 4.72 10*3/MM3 (ref 1.7–7)
NEUTROPHILS NFR BLD AUTO: 64.1 % (ref 42.7–76)
NRBC BLD AUTO-RTO: 0 /100 WBC (ref 0–0.2)
PLATELET # BLD AUTO: 246 10*3/MM3 (ref 140–450)
PMV BLD AUTO: 10.6 FL (ref 6–12)
POTASSIUM SERPL-SCNC: 3.3 MMOL/L (ref 3.5–5.2)
PROT SERPL-MCNC: 7.8 G/DL (ref 6–8.5)
PROTHROMBIN TIME: 14 SECONDS (ref 12.3–15.1)
RBC # BLD AUTO: 4.38 10*6/MM3 (ref 3.77–5.28)
SODIUM SERPL-SCNC: 139 MMOL/L (ref 136–145)
TROPONIN T NUMERIC DELTA: -1 NG/L
TROPONIN T SERPL HS-MCNC: 10 NG/L
WBC NRBC COR # BLD AUTO: 7.36 10*3/MM3 (ref 3.4–10.8)
WHOLE BLOOD HOLD COAG: NORMAL
WHOLE BLOOD HOLD SPECIMEN: NORMAL

## 2025-08-05 PROCEDURE — 70450 CT HEAD/BRAIN W/O DYE: CPT

## 2025-08-05 PROCEDURE — 80053 COMPREHEN METABOLIC PANEL: CPT | Performed by: EMERGENCY MEDICINE

## 2025-08-05 PROCEDURE — 84484 ASSAY OF TROPONIN QUANT: CPT | Performed by: EMERGENCY MEDICINE

## 2025-08-05 PROCEDURE — 85730 THROMBOPLASTIN TIME PARTIAL: CPT | Performed by: EMERGENCY MEDICINE

## 2025-08-05 PROCEDURE — 99284 EMERGENCY DEPT VISIT MOD MDM: CPT | Performed by: EMERGENCY MEDICINE

## 2025-08-05 PROCEDURE — 85610 PROTHROMBIN TIME: CPT | Performed by: EMERGENCY MEDICINE

## 2025-08-05 PROCEDURE — 85025 COMPLETE CBC W/AUTO DIFF WBC: CPT | Performed by: EMERGENCY MEDICINE

## 2025-08-05 PROCEDURE — 73630 X-RAY EXAM OF FOOT: CPT

## 2025-08-05 PROCEDURE — 83735 ASSAY OF MAGNESIUM: CPT | Performed by: EMERGENCY MEDICINE

## 2025-08-05 PROCEDURE — 72125 CT NECK SPINE W/O DYE: CPT

## 2025-08-05 PROCEDURE — 93005 ELECTROCARDIOGRAM TRACING: CPT | Performed by: EMERGENCY MEDICINE

## 2025-08-05 PROCEDURE — 36415 COLL VENOUS BLD VENIPUNCTURE: CPT

## 2025-08-05 RX ORDER — SODIUM CHLORIDE 0.9 % (FLUSH) 0.9 %
10 SYRINGE (ML) INJECTION AS NEEDED
Status: DISCONTINUED | OUTPATIENT
Start: 2025-08-05 | End: 2025-08-05 | Stop reason: HOSPADM

## 2025-08-05 RX ORDER — POTASSIUM CHLORIDE 750 MG/1
40 CAPSULE, EXTENDED RELEASE ORAL ONCE
Status: COMPLETED | OUTPATIENT
Start: 2025-08-05 | End: 2025-08-05

## 2025-08-05 RX ADMIN — POTASSIUM CHLORIDE 40 MEQ: 750 CAPSULE, EXTENDED RELEASE ORAL at 18:56

## 2025-08-22 ENCOUNTER — OFFICE VISIT (OUTPATIENT)
Dept: INTERNAL MEDICINE | Facility: CLINIC | Age: 75
End: 2025-08-22
Payer: MEDICARE

## 2025-08-22 VITALS
WEIGHT: 144 LBS | HEIGHT: 65 IN | OXYGEN SATURATION: 97 % | SYSTOLIC BLOOD PRESSURE: 134 MMHG | TEMPERATURE: 98.4 F | HEART RATE: 62 BPM | BODY MASS INDEX: 23.99 KG/M2 | DIASTOLIC BLOOD PRESSURE: 82 MMHG

## 2025-08-22 DIAGNOSIS — R55 SYNCOPE AND COLLAPSE: ICD-10-CM

## 2025-08-22 DIAGNOSIS — I63.30 CEREBROVASCULAR ACCIDENT (CVA) DUE TO THROMBOSIS OF CEREBRAL ARTERY: ICD-10-CM

## 2025-08-22 DIAGNOSIS — E78.2 MIXED HYPERLIPIDEMIA: ICD-10-CM

## 2025-08-22 DIAGNOSIS — R25.2 MUSCLE CRAMPS: ICD-10-CM

## 2025-08-22 DIAGNOSIS — I10 BENIGN HYPERTENSION: ICD-10-CM

## 2025-08-22 DIAGNOSIS — F41.9 ANXIETY: ICD-10-CM

## 2025-08-22 DIAGNOSIS — R73.9 HYPERGLYCEMIA: ICD-10-CM

## 2025-08-22 DIAGNOSIS — E87.6 HYPOKALEMIA: Primary | ICD-10-CM

## 2025-08-22 PROCEDURE — 3079F DIAST BP 80-89 MM HG: CPT | Performed by: NURSE PRACTITIONER

## 2025-08-22 PROCEDURE — 1159F MED LIST DOCD IN RCRD: CPT | Performed by: NURSE PRACTITIONER

## 2025-08-22 PROCEDURE — 1126F AMNT PAIN NOTED NONE PRSNT: CPT | Performed by: NURSE PRACTITIONER

## 2025-08-22 PROCEDURE — 3075F SYST BP GE 130 - 139MM HG: CPT | Performed by: NURSE PRACTITIONER

## 2025-08-22 PROCEDURE — 1160F RVW MEDS BY RX/DR IN RCRD: CPT | Performed by: NURSE PRACTITIONER

## 2025-08-22 PROCEDURE — 99214 OFFICE O/P EST MOD 30 MIN: CPT | Performed by: NURSE PRACTITIONER

## 2025-08-22 PROCEDURE — G2211 COMPLEX E/M VISIT ADD ON: HCPCS | Performed by: NURSE PRACTITIONER

## 2025-08-22 RX ORDER — LOTILANER OPHTHALMIC SOLUTION 2.5 MG/ML
SOLUTION/ DROPS OPHTHALMIC
COMMUNITY
Start: 2025-07-11

## 2025-08-23 LAB
BUN SERPL-MCNC: 16 MG/DL (ref 8–27)
BUN/CREAT SERPL: 19 (ref 12–28)
CALCIUM SERPL-MCNC: 9.8 MG/DL (ref 8.7–10.3)
CHLORIDE SERPL-SCNC: 101 MMOL/L (ref 96–106)
CO2 SERPL-SCNC: 25 MMOL/L (ref 20–29)
CREAT SERPL-MCNC: 0.85 MG/DL (ref 0.57–1)
EGFRCR SERPLBLD CKD-EPI 2021: 71 ML/MIN/1.73
GLUCOSE SERPL-MCNC: 96 MG/DL (ref 70–99)
HBA1C MFR BLD: 5.3 % (ref 4.8–5.6)
MAGNESIUM SERPL-MCNC: 2.2 MG/DL (ref 1.6–2.3)
POTASSIUM SERPL-SCNC: 3.7 MMOL/L (ref 3.5–5.2)
SODIUM SERPL-SCNC: 141 MMOL/L (ref 134–144)